# Patient Record
Sex: FEMALE | Race: OTHER | Employment: OTHER | URBAN - METROPOLITAN AREA
[De-identification: names, ages, dates, MRNs, and addresses within clinical notes are randomized per-mention and may not be internally consistent; named-entity substitution may affect disease eponyms.]

---

## 2020-01-20 PROBLEM — Z00.00 MEDICARE ANNUAL WELLNESS VISIT, INITIAL: Status: ACTIVE | Noted: 2020-01-20

## 2020-01-20 PROBLEM — Z12.31 VISIT FOR SCREENING MAMMOGRAM: Status: ACTIVE | Noted: 2020-01-20

## 2020-06-18 PROBLEM — Z12.11 COLON CANCER SCREENING: Status: ACTIVE | Noted: 2020-01-20

## 2021-02-09 PROBLEM — Z00.00 MEDICARE ANNUAL WELLNESS VISIT, SUBSEQUENT: Status: ACTIVE | Noted: 2020-01-20

## 2021-02-09 PROBLEM — R73.01 IMPAIRED FASTING GLUCOSE: Status: ACTIVE | Noted: 2021-02-09

## 2021-08-24 PROBLEM — E55.9 VITAMIN D DEFICIENCY: Status: ACTIVE | Noted: 2021-08-24

## 2021-08-24 PROBLEM — Z12.11 COLON CANCER SCREENING: Status: ACTIVE | Noted: 2021-08-24

## 2022-02-18 ENCOUNTER — LABORATORY ENCOUNTER (OUTPATIENT)
Dept: LAB | Age: 72
End: 2022-02-18
Attending: ORTHOPAEDIC SURGERY
Payer: MEDICARE

## 2022-02-18 ENCOUNTER — LAB ENCOUNTER (OUTPATIENT)
Dept: LAB | Age: 72
End: 2022-02-18
Attending: ORTHOPAEDIC SURGERY
Payer: MEDICARE

## 2022-02-18 DIAGNOSIS — M17.0 PRIMARY OSTEOARTHRITIS OF BOTH KNEES: ICD-10-CM

## 2022-02-18 LAB
ANTIBODY SCREEN: NEGATIVE
RH BLOOD TYPE: POSITIVE

## 2022-02-18 PROCEDURE — 86900 BLOOD TYPING SEROLOGIC ABO: CPT

## 2022-02-18 PROCEDURE — 86850 RBC ANTIBODY SCREEN: CPT

## 2022-02-18 PROCEDURE — 86901 BLOOD TYPING SEROLOGIC RH(D): CPT

## 2022-02-19 LAB — SARS-COV-2 RNA RESP QL NAA+PROBE: NOT DETECTED

## 2022-02-20 ENCOUNTER — ANESTHESIA EVENT (OUTPATIENT)
Dept: SURGERY | Facility: HOSPITAL | Age: 72
End: 2022-02-20
Payer: MEDICARE

## 2022-02-21 ENCOUNTER — APPOINTMENT (OUTPATIENT)
Dept: GENERAL RADIOLOGY | Facility: HOSPITAL | Age: 72
End: 2022-02-21
Attending: ORTHOPAEDIC SURGERY
Payer: MEDICARE

## 2022-02-21 ENCOUNTER — ANESTHESIA (OUTPATIENT)
Dept: SURGERY | Facility: HOSPITAL | Age: 72
End: 2022-02-21
Payer: MEDICARE

## 2022-02-21 ENCOUNTER — HOSPITAL ENCOUNTER (OUTPATIENT)
Facility: HOSPITAL | Age: 72
Discharge: HOME HEALTH | End: 2022-02-22
Attending: ORTHOPAEDIC SURGERY | Admitting: ORTHOPAEDIC SURGERY
Payer: MEDICARE

## 2022-02-21 DIAGNOSIS — M17.0 PRIMARY OSTEOARTHRITIS OF BOTH KNEES: Primary | ICD-10-CM

## 2022-02-21 LAB
APTT PPP: 31.6 SECONDS (ref 23.3–35.6)
INR BLD: 0.95 (ref 0.8–1.2)
PROTHROMBIN TIME: 12.6 SECONDS (ref 11.6–14.8)

## 2022-02-21 PROCEDURE — 88305 TISSUE EXAM BY PATHOLOGIST: CPT | Performed by: ORTHOPAEDIC SURGERY

## 2022-02-21 PROCEDURE — 85610 PROTHROMBIN TIME: CPT | Performed by: ORTHOPAEDIC SURGERY

## 2022-02-21 PROCEDURE — 97530 THERAPEUTIC ACTIVITIES: CPT

## 2022-02-21 PROCEDURE — 85730 THROMBOPLASTIN TIME PARTIAL: CPT | Performed by: ORTHOPAEDIC SURGERY

## 2022-02-21 PROCEDURE — 0SRC0J9 REPLACEMENT OF RIGHT KNEE JOINT WITH SYNTHETIC SUBSTITUTE, CEMENTED, OPEN APPROACH: ICD-10-PCS | Performed by: ORTHOPAEDIC SURGERY

## 2022-02-21 PROCEDURE — 97161 PT EVAL LOW COMPLEX 20 MIN: CPT

## 2022-02-21 PROCEDURE — 73560 X-RAY EXAM OF KNEE 1 OR 2: CPT | Performed by: ORTHOPAEDIC SURGERY

## 2022-02-21 PROCEDURE — 88311 DECALCIFY TISSUE: CPT | Performed by: ORTHOPAEDIC SURGERY

## 2022-02-21 PROCEDURE — 76942 ECHO GUIDE FOR BIOPSY: CPT | Performed by: ANESTHESIOLOGY

## 2022-02-21 DEVICE — IMPLANTABLE DEVICE
Type: IMPLANTABLE DEVICE | Site: KNEE | Status: FUNCTIONAL
Brand: PERSONA® VIVACIT-E®

## 2022-02-21 DEVICE — IMPLANTABLE DEVICE
Type: IMPLANTABLE DEVICE | Site: KNEE | Status: FUNCTIONAL
Brand: PERSONA®

## 2022-02-21 DEVICE — IMPLANTABLE DEVICE
Type: IMPLANTABLE DEVICE | Site: KNEE | Status: FUNCTIONAL
Brand: PERSONA™

## 2022-02-21 DEVICE — IMPLANTABLE DEVICE
Type: IMPLANTABLE DEVICE | Site: KNEE | Status: FUNCTIONAL
Brand: BIOMET® BONE CEMENT R

## 2022-02-21 DEVICE — IMPLANTABLE DEVICE
Type: IMPLANTABLE DEVICE | Site: KNEE | Status: FUNCTIONAL
Brand: PERSONA® NATURAL TIBIA®

## 2022-02-21 RX ORDER — EPHEDRINE SULFATE 50 MG/ML
INJECTION INTRAVENOUS AS NEEDED
Status: DISCONTINUED | OUTPATIENT
Start: 2022-02-21 | End: 2022-02-21 | Stop reason: SURG

## 2022-02-21 RX ORDER — BUPIVACAINE HYDROCHLORIDE 7.5 MG/ML
INJECTION, SOLUTION INTRASPINAL AS NEEDED
Status: DISCONTINUED | OUTPATIENT
Start: 2022-02-21 | End: 2022-02-21 | Stop reason: SURG

## 2022-02-21 RX ORDER — CEFAZOLIN SODIUM/WATER 2 G/20 ML
2 SYRINGE (ML) INTRAVENOUS EVERY 8 HOURS
Status: COMPLETED | OUTPATIENT
Start: 2022-02-21 | End: 2022-02-22

## 2022-02-21 RX ORDER — ACETAMINOPHEN 325 MG/1
TABLET ORAL
Status: COMPLETED
Start: 2022-02-21 | End: 2022-02-21

## 2022-02-21 RX ORDER — METOCLOPRAMIDE HYDROCHLORIDE 5 MG/ML
10 INJECTION INTRAMUSCULAR; INTRAVENOUS AS NEEDED
Status: DISCONTINUED | OUTPATIENT
Start: 2022-02-21 | End: 2022-02-21 | Stop reason: HOSPADM

## 2022-02-21 RX ORDER — BISACODYL 10 MG
10 SUPPOSITORY, RECTAL RECTAL
Status: DISCONTINUED | OUTPATIENT
Start: 2022-02-21 | End: 2022-02-22

## 2022-02-21 RX ORDER — ACETAMINOPHEN 500 MG
1000 TABLET ORAL ONCE
Status: DISCONTINUED | OUTPATIENT
Start: 2022-02-21 | End: 2022-02-21 | Stop reason: HOSPADM

## 2022-02-21 RX ORDER — NALOXONE HYDROCHLORIDE 0.4 MG/ML
80 INJECTION, SOLUTION INTRAMUSCULAR; INTRAVENOUS; SUBCUTANEOUS AS NEEDED
Status: DISCONTINUED | OUTPATIENT
Start: 2022-02-21 | End: 2022-02-21 | Stop reason: HOSPADM

## 2022-02-21 RX ORDER — SODIUM CHLORIDE, SODIUM LACTATE, POTASSIUM CHLORIDE, CALCIUM CHLORIDE 600; 310; 30; 20 MG/100ML; MG/100ML; MG/100ML; MG/100ML
INJECTION, SOLUTION INTRAVENOUS CONTINUOUS
Status: DISCONTINUED | OUTPATIENT
Start: 2022-02-21 | End: 2022-02-21 | Stop reason: HOSPADM

## 2022-02-21 RX ORDER — DEXAMETHASONE SODIUM PHOSPHATE 10 MG/ML
8 INJECTION, SOLUTION INTRAMUSCULAR; INTRAVENOUS ONCE
Status: COMPLETED | OUTPATIENT
Start: 2022-02-22 | End: 2022-02-22

## 2022-02-21 RX ORDER — ONDANSETRON 2 MG/ML
INJECTION INTRAMUSCULAR; INTRAVENOUS AS NEEDED
Status: DISCONTINUED | OUTPATIENT
Start: 2022-02-21 | End: 2022-02-21 | Stop reason: SURG

## 2022-02-21 RX ORDER — OXYCODONE HYDROCHLORIDE 5 MG/1
2.5 TABLET ORAL EVERY 4 HOURS PRN
Status: DISCONTINUED | OUTPATIENT
Start: 2022-02-21 | End: 2022-02-22

## 2022-02-21 RX ORDER — DOCUSATE SODIUM 100 MG/1
100 CAPSULE, LIQUID FILLED ORAL 2 TIMES DAILY
Status: DISCONTINUED | OUTPATIENT
Start: 2022-02-21 | End: 2022-02-22

## 2022-02-21 RX ORDER — PHENYLEPHRINE HCL 10 MG/ML
VIAL (ML) INJECTION AS NEEDED
Status: DISCONTINUED | OUTPATIENT
Start: 2022-02-21 | End: 2022-02-21 | Stop reason: SURG

## 2022-02-21 RX ORDER — OXYCODONE HYDROCHLORIDE 5 MG/1
5 TABLET ORAL EVERY 4 HOURS PRN
Status: DISCONTINUED | OUTPATIENT
Start: 2022-02-21 | End: 2022-02-22

## 2022-02-21 RX ORDER — ACETAMINOPHEN 325 MG/1
650 TABLET ORAL ONCE
Status: COMPLETED | OUTPATIENT
Start: 2022-02-21 | End: 2022-02-21

## 2022-02-21 RX ORDER — SODIUM CHLORIDE 9 MG/ML
INJECTION, SOLUTION INTRAVENOUS CONTINUOUS
Status: DISCONTINUED | OUTPATIENT
Start: 2022-02-21 | End: 2022-02-22

## 2022-02-21 RX ORDER — HYDROCODONE BITARTRATE AND ACETAMINOPHEN 5; 325 MG/1; MG/1
2 TABLET ORAL AS NEEDED
Status: DISCONTINUED | OUTPATIENT
Start: 2022-02-21 | End: 2022-02-21 | Stop reason: HOSPADM

## 2022-02-21 RX ORDER — TRAMADOL HYDROCHLORIDE 50 MG/1
50 TABLET ORAL EVERY 6 HOURS
Status: DISCONTINUED | OUTPATIENT
Start: 2022-02-21 | End: 2022-02-22

## 2022-02-21 RX ORDER — ONDANSETRON 2 MG/ML
4 INJECTION INTRAMUSCULAR; INTRAVENOUS EVERY 4 HOURS PRN
Status: DISCONTINUED | OUTPATIENT
Start: 2022-02-21 | End: 2022-02-22

## 2022-02-21 RX ORDER — BUPRENORPHINE HYDROCHLORIDE 0.32 MG/ML
INJECTION INTRAMUSCULAR; INTRAVENOUS AS NEEDED
Status: DISCONTINUED | OUTPATIENT
Start: 2022-02-21 | End: 2022-02-21 | Stop reason: SURG

## 2022-02-21 RX ORDER — DIPHENHYDRAMINE HCL 25 MG
25 CAPSULE ORAL EVERY 4 HOURS PRN
Status: DISCONTINUED | OUTPATIENT
Start: 2022-02-21 | End: 2022-02-22

## 2022-02-21 RX ORDER — HYDROMORPHONE HYDROCHLORIDE 1 MG/ML
0.4 INJECTION, SOLUTION INTRAMUSCULAR; INTRAVENOUS; SUBCUTANEOUS EVERY 2 HOUR PRN
Status: DISCONTINUED | OUTPATIENT
Start: 2022-02-21 | End: 2022-02-22

## 2022-02-21 RX ORDER — ACETAMINOPHEN 325 MG/1
650 TABLET ORAL 4 TIMES DAILY
Status: DISCONTINUED | OUTPATIENT
Start: 2022-02-21 | End: 2022-02-22

## 2022-02-21 RX ORDER — ONDANSETRON 2 MG/ML
4 INJECTION INTRAMUSCULAR; INTRAVENOUS AS NEEDED
Status: DISCONTINUED | OUTPATIENT
Start: 2022-02-21 | End: 2022-02-21 | Stop reason: HOSPADM

## 2022-02-21 RX ORDER — ASPIRIN 81 MG/1
81 TABLET ORAL 2 TIMES DAILY
Status: DISCONTINUED | OUTPATIENT
Start: 2022-02-21 | End: 2022-02-22

## 2022-02-21 RX ORDER — SODIUM PHOSPHATE, DIBASIC AND SODIUM PHOSPHATE, MONOBASIC 7; 19 G/133ML; G/133ML
1 ENEMA RECTAL ONCE AS NEEDED
Status: DISCONTINUED | OUTPATIENT
Start: 2022-02-21 | End: 2022-02-22

## 2022-02-21 RX ORDER — HYDROMORPHONE HYDROCHLORIDE 1 MG/ML
0.2 INJECTION, SOLUTION INTRAMUSCULAR; INTRAVENOUS; SUBCUTANEOUS EVERY 2 HOUR PRN
Status: DISCONTINUED | OUTPATIENT
Start: 2022-02-21 | End: 2022-02-22

## 2022-02-21 RX ORDER — TRANEXAMIC ACID 10 MG/ML
1000 INJECTION, SOLUTION INTRAVENOUS ONCE
Status: COMPLETED | OUTPATIENT
Start: 2022-02-21 | End: 2022-02-21

## 2022-02-21 RX ORDER — MIDAZOLAM HYDROCHLORIDE 1 MG/ML
1 INJECTION INTRAMUSCULAR; INTRAVENOUS EVERY 5 MIN PRN
Status: DISCONTINUED | OUTPATIENT
Start: 2022-02-21 | End: 2022-02-21 | Stop reason: HOSPADM

## 2022-02-21 RX ORDER — SODIUM CHLORIDE, SODIUM LACTATE, POTASSIUM CHLORIDE, CALCIUM CHLORIDE 600; 310; 30; 20 MG/100ML; MG/100ML; MG/100ML; MG/100ML
INJECTION, SOLUTION INTRAVENOUS CONTINUOUS
Status: DISCONTINUED | OUTPATIENT
Start: 2022-02-21 | End: 2022-02-21

## 2022-02-21 RX ORDER — MELATONIN
325
Status: DISCONTINUED | OUTPATIENT
Start: 2022-02-22 | End: 2022-02-22

## 2022-02-21 RX ORDER — DIPHENHYDRAMINE HYDROCHLORIDE 50 MG/ML
12.5 INJECTION INTRAMUSCULAR; INTRAVENOUS EVERY 4 HOURS PRN
Status: DISCONTINUED | OUTPATIENT
Start: 2022-02-21 | End: 2022-02-22

## 2022-02-21 RX ORDER — DEXAMETHASONE SODIUM PHOSPHATE 4 MG/ML
4 VIAL (ML) INJECTION AS NEEDED
Status: DISCONTINUED | OUTPATIENT
Start: 2022-02-21 | End: 2022-02-21 | Stop reason: HOSPADM

## 2022-02-21 RX ORDER — SENNOSIDES 8.6 MG
17.2 TABLET ORAL NIGHTLY
Status: DISCONTINUED | OUTPATIENT
Start: 2022-02-21 | End: 2022-02-22

## 2022-02-21 RX ORDER — PRAVASTATIN SODIUM 10 MG
10 TABLET ORAL NIGHTLY
Refills: 0 | Status: DISCONTINUED | OUTPATIENT
Start: 2022-02-21 | End: 2022-02-22

## 2022-02-21 RX ORDER — DIPHENHYDRAMINE HYDROCHLORIDE 50 MG/ML
25 INJECTION INTRAMUSCULAR; INTRAVENOUS ONCE AS NEEDED
Status: ACTIVE | OUTPATIENT
Start: 2022-02-21 | End: 2022-02-21

## 2022-02-21 RX ORDER — DEXAMETHASONE SODIUM PHOSPHATE 4 MG/ML
VIAL (ML) INJECTION AS NEEDED
Status: DISCONTINUED | OUTPATIENT
Start: 2022-02-21 | End: 2022-02-21 | Stop reason: SURG

## 2022-02-21 RX ORDER — HYDROMORPHONE HYDROCHLORIDE 1 MG/ML
0.4 INJECTION, SOLUTION INTRAMUSCULAR; INTRAVENOUS; SUBCUTANEOUS EVERY 5 MIN PRN
Status: DISCONTINUED | OUTPATIENT
Start: 2022-02-21 | End: 2022-02-21 | Stop reason: HOSPADM

## 2022-02-21 RX ORDER — CEFAZOLIN SODIUM/WATER 2 G/20 ML
2 SYRINGE (ML) INTRAVENOUS ONCE
Status: COMPLETED | OUTPATIENT
Start: 2022-02-21 | End: 2022-02-21

## 2022-02-21 RX ORDER — LISINOPRIL AND HYDROCHLOROTHIAZIDE 20; 12.5 MG/1; MG/1
1 TABLET ORAL
Status: DISCONTINUED | OUTPATIENT
Start: 2022-02-22 | End: 2022-02-21 | Stop reason: SDUPTHER

## 2022-02-21 RX ORDER — PROCHLORPERAZINE EDISYLATE 5 MG/ML
10 INJECTION INTRAMUSCULAR; INTRAVENOUS EVERY 6 HOURS PRN
Status: DISCONTINUED | OUTPATIENT
Start: 2022-02-21 | End: 2022-02-22

## 2022-02-21 RX ORDER — HYDROCODONE BITARTRATE AND ACETAMINOPHEN 5; 325 MG/1; MG/1
1 TABLET ORAL AS NEEDED
Status: DISCONTINUED | OUTPATIENT
Start: 2022-02-21 | End: 2022-02-21 | Stop reason: HOSPADM

## 2022-02-21 RX ORDER — POLYETHYLENE GLYCOL 3350 17 G/17G
17 POWDER, FOR SOLUTION ORAL DAILY PRN
Status: DISCONTINUED | OUTPATIENT
Start: 2022-02-21 | End: 2022-02-22

## 2022-02-21 RX ORDER — KETOROLAC TROMETHAMINE 15 MG/ML
15 INJECTION, SOLUTION INTRAMUSCULAR; INTRAVENOUS EVERY 6 HOURS
Status: COMPLETED | OUTPATIENT
Start: 2022-02-21 | End: 2022-02-22

## 2022-02-21 RX ADMIN — TRANEXAMIC ACID 1000 MG: 10 INJECTION, SOLUTION INTRAVENOUS at 08:37:00

## 2022-02-21 RX ADMIN — PHENYLEPHRINE HCL 200 MCG: 10 MG/ML VIAL (ML) INJECTION at 08:50:00

## 2022-02-21 RX ADMIN — CEFAZOLIN SODIUM/WATER 2 G: 2 G/20 ML SYRINGE (ML) INTRAVENOUS at 08:35:00

## 2022-02-21 RX ADMIN — PHENYLEPHRINE HCL 200 MCG: 10 MG/ML VIAL (ML) INJECTION at 09:04:00

## 2022-02-21 RX ADMIN — BUPIVACAINE HYDROCHLORIDE 1.6 ML: 7.5 INJECTION, SOLUTION INTRASPINAL at 08:29:00

## 2022-02-21 RX ADMIN — PHENYLEPHRINE HCL 200 MCG: 10 MG/ML VIAL (ML) INJECTION at 08:35:00

## 2022-02-21 RX ADMIN — DEXAMETHASONE SODIUM PHOSPHATE 4 MG: 4 MG/ML VIAL (ML) INJECTION at 08:43:00

## 2022-02-21 RX ADMIN — BUPRENORPHINE HYDROCHLORIDE 150 MCG: 0.32 INJECTION INTRAMUSCULAR; INTRAVENOUS at 08:33:00

## 2022-02-21 RX ADMIN — EPHEDRINE SULFATE 5 MG: 50 INJECTION INTRAVENOUS at 09:06:00

## 2022-02-21 RX ADMIN — EPHEDRINE SULFATE 10 MG: 50 INJECTION INTRAVENOUS at 09:20:00

## 2022-02-21 RX ADMIN — DEXAMETHASONE SODIUM PHOSPHATE 2 MG: 4 MG/ML VIAL (ML) INJECTION at 08:33:00

## 2022-02-21 RX ADMIN — ONDANSETRON 4 MG: 2 INJECTION INTRAMUSCULAR; INTRAVENOUS at 08:43:00

## 2022-02-21 RX ADMIN — SODIUM CHLORIDE, SODIUM LACTATE, POTASSIUM CHLORIDE, CALCIUM CHLORIDE: 600; 310; 30; 20 INJECTION, SOLUTION INTRAVENOUS at 09:21:00

## 2022-02-21 NOTE — PLAN OF CARE
Problem: PAIN - ADULT  Goal: Verbalizes/displays adequate comfort level or patient's stated pain goal  Description: INTERVENTIONS:  - Encourage pt to monitor pain and request assistance  - Assess pain using appropriate pain scale  - Administer analgesics based on type and severity of pain and evaluate response  - Implement non-pharmacological measures as appropriate and evaluate response  - Consider cultural and social influences on pain and pain management  - Manage/alleviate anxiety  - Utilize distraction and/or relaxation techniques  - Monitor for opioid side effects  - Notify MD/LIP if interventions unsuccessful or patient reports new pain  - Anticipate increased pain with activity and pre-medicate as appropriate  Outcome: Progressing     Problem: SAFETY ADULT - FALL  Goal: Free from fall injury  Description: INTERVENTIONS:  - Assess pt frequently for physical needs  - Identify cognitive and physical deficits and behaviors that affect risk of falls.   - [place] fall precautions as indicated by assessment.  - Educate pt/family on patient safety including physical limitations  - Instruct pt to call for assistance with activity based on assessment  - Modify environment to reduce risk of injury  - Provide assistive devices as appropriate  - Consider OT/PT consult to assist with strengthening/mobility  - Encourage toileting schedule  Outcome: Progressing     Anticipate PT to evaluate patient later today

## 2022-02-21 NOTE — PROGRESS NOTES
NURSING ADMISSION NOTE      Patient admitted via Cart/ bed from PACU post, right total knee replacement. Oriented to room. Received awake, alert and oriented x 4, primarily [place] speaking, daughter at the bedside to interpret. Patient verbalized feeling sleepy. Safety precautions initiated. Post surgical orders discussed with patient and daughter. Bed in low position. Vital signs taken and body system assessment done. Call light in reach. Reinforced instructions regarding \"call and don't fall\" safety protocol. [facility] Dr. Chelsea herring, waiting for call back.

## 2022-02-21 NOTE — ANESTHESIA PROCEDURE NOTES
Spinal Block  Performed by: Hannah James MD  Authorized by: Hannah James MD       General Information and Staff    Start Time:  2/21/2022 8:25 AM  End Time:  2/21/2022 8:29 AM  Anesthesiologist:  Hannah James MD  Performed by:   Anesthesiologist  Site identification: surface landmarks    Preanesthetic Checklist: patient identified, IV checked, risks and benefits discussed, monitors and equipment checked, pre-op evaluation, timeout performed, anesthesia consent and sterile technique used      Procedure Details    Patient Position:  Sitting  Prep: ChloraPrep    Monitoring:  Cardiac monitor, heart rate and continuous pulse ox  Approach:  Midline  Location:  L3-4  Injection Technique:  Single-shot    Needle    Needle Type:  Sprotte  Needle Gauge:  24 G  Needle Length:  3.5 in    Assessment    Sensory Level:   Events: clear CSF, CSF aspirated, well tolerated and blood negative     Additional Comments

## 2022-02-21 NOTE — OPERATIVE REPORT
OPERATIVE REPORT    Facility: [facility]  Patient name: Ed Mary  : 1950        Medical record: [id number]  Date of procedure: 2022  Pre-operative diagnosis: Left knee end-stage degenerative joint disease  Post-operative diagnosis: Same  Procedure performed: Left total knee arthroplasty  Implants: [employer] TKA   Femur: size 7 Persona narrow  CR   Tibial tray: size D Persona with 30 mm stem given bone quality and BMI to add stability    Patella - 35 mm   Bearing - 10 mm Medial Congruent  Surgeon: Mark Navarro M.D. Assistant(s):  1. BHARATH Noe  2. Amirah Regalado  Anesthesia: Epidural/Adductor Canal Femoral Nerve Block  Estimated blood loss: 150 milliliters   Drains: none  Specimens: None  Complications: None apparent            INDICATIONS:  Ed Mary is a pleasant 66year old year old who presented to my office with a long history of knee pain. The patient failed conservative therapy and we discussed surgical treatment options including total knee arthroplasty. Prior to surgery we discussed the risks, benefits, alternatives to surgery, and surgical convalescence. Surgical consent was obtained both in the office, as well as the day of surgery. Risks of the procedure include, but are not limited to, infection, DVT, PE, damage to nerves or blood vessels at the time of surgery, bleeding and blood loss, stiffness/arthrofibrosis, and risk of loosening or failure of the components requiring revision surgery. The patient expressed understanding and wished to proceed with the surgery. An informed consent form was signed and placed on the chart prior to coming to the Operating Room. PROCEDURE: The patient was brought to the operating room and once obtaining satisfactory anesthesia was placed in the supine position. The knee was prepped and draped in the usual sterile fashion for a total knee replacement.      A tourniquet was in the room but not applied/inflated during the procedure in an effort to diminish quadriceps pain/dysfunction post operatively. A surgical timeout was held verifying the site, procedure, and that pre-operative antibiotics had been given. A longitudinal incision was over the anterior aspect of the knee exposing the extensor mechanism. An arthrotomy was performed and the patella displaced laterally. The gross pathologic findings revealed severe degenerative arthritis. Subperiosteal dissection was continued around the proximal medial tibia. The necessary soft tissue release was performed to correct the fixed angular deformity. Care was taken to protect and preserve the medial collateral ligament. Following this, the drill was used to open the femoral canal. After over drilling the canal, the intramedullary femoral guide was seated and the distal femur was resected at the stated valgus angle. The extramedullary tibial cutting guide was then placed and the proximal tibia was resected at the appropriate level perpendicular to the long axis of the tibia. The epicondylar axis and AP axis were determined. The femoral sizing guide was set at the appropriate degree of external rotation. The femur was then sized and the appropriate component selected. The anterior and posterior condyles were then resected with the appropriately sized guide and oscillating saw. With the knee at 90 degrees of flexion, laminar spacers were placed between the femur and tibia. The anterior cruciate ligament was excised. The posterior cruciate ligament was left intact. A medial and lateral meniscectomy were performed. A mixture of 30cc of 0.5% ropivicaine, 10mg Morphine, 30mg toradol, 0.5mg of epinephrine, clonidine 1mL were injected into the knee joint capsule posteriorly while the lamina spreaders were in position and at the capsulotomy incision sites prior to closure for local anesthesia.      The flexion and extension gaps were checked with the appropriate sized spacer and noted to be well balanced. At this time, the tibial cut was checked with the spacer block and the alignment raymon. The distal end of the femur was sculptured with the notch and chamfer cutting guide using an oscillating saw. The tibial fixation hole was created with the tibial template and broach. Prepped with a short stem as well for added stability given medial bone cyst (bone quality) and BMI for added stability. The synovium was excised from around the patella, after which the patella thickness was measured with calipers. The patella was then prepared with the patellar reaming system. The appropriate sized patellar template was seated and the three fixation holes were created with a drill. A bone plug was then shaped and place in the opening created for the intramedullary femoral guide. The trial components were then placed and the knee was found to have proper alignment and was stable through a full range of flexion and extension. The trial components were removed and the bony surfaces were cleaned with pulsatile lavage and an antibiotic solution. The bone was then dried and the permanent components were cemented in a sequential fashion. The tibial component was cemented first. Set in the proper position and rotation. The tibial component was impacted into place, it was fully seated and excess cemented was removed. The femoral component was then cemented in place followed by the patella. Excess cement was removed. Once the cement was hardened the tibial articular surface was implanted. The knee was then reduced and found to have good flexion, full extension with good stability and proper alignment. The patella tracked central.    A dilute (0.35%) betadine lavage was placed in the arthrotomy site to soak the components for 3 minutes prior to wound closure. The solution was made by adding 17.5 ml of 10% povidone-iodine in 500 cc of normal saline, resulting in a 0.35% dilution.  This solution was then removed from the knee and the knee was copiously irrigated. Hemostasis was obtained with electrocautery. The knee irrigated with pulsatile lavage and antibiotic solution followed by normal saline. The arthrotomy was closed with #1 stratafix barbed suture. The subcutaneous tissue was closed in layers with # 0 and # 2-0 Vicryl interrupted sutures. The skin was closed with 3-0 stratfix barbed sutures and dermabond. A sterile compressive dressing was applied. The patient tolerated the procedure well, without complication, and was transferred to the recovery room in a stable condition. The sponge and instrument count was correct. A surgical first assistant was required and necessary for the completion of this case to aid in manipulation and positioning of the extremity while the surgeon operated. Their assistance was vital to the safety and success of the procedure. Deep venous thrombosis prophylaxis will consist of aspirin twice daily according to CHEST guidelines. ____________________________  Franklin Colbert M.D.

## 2022-02-21 NOTE — ANESTHESIA PROCEDURE NOTES
Regional Block  Performed by: Jaime Chambers MD  Authorized by: Jaime Chambers MD       General Information and Staff    Start Time:  2/21/2022 8:32 AM  End Time:  2/21/2022 8:34 AM  Anesthesiologist:  Jaime Chambers MD  Performed by: Anesthesiologist  Patient Location:  OR      Site Identification: real time ultrasound guided and image stored and retrievable    Block site/laterality marked before start: site marked  Reason for Block: at surgeon's request and post-op pain management    Preanesthetic Checklist: 2 patient identifers, IV checked, risks and benefits discussed, monitors and equipment checked, pre-op evaluation, timeout performed, anesthesia consent, sterile technique used, no prohibitive neurological deficits and no local skin infection at insertion site      Procedure Details    Patient Position:  Supine  Prep: ChloraPrep    Monitoring:  Cardiac monitor, continuous pulse ox and blood pressure cuff  Block Type: Adductor canal  Laterality:  Right  Injection Technique:  Single-shot    Needle    Needle Type:  Short-bevel and echogenic  Needle Gauge:  21 G  Needle Length:  100 mm  Needle Localization:  Ultrasound guidance  Reason for Ultrasound Use: appropriate spread of the medication was noted in real time and no ultrasound evidence of intravascular and/or intraneural injection            Assessment    Injection Assessment:  Good spread noted, negative resistance, negative aspiration for heme, incremental injection and low pressure  Heart Rate Change: No    - Patient tolerated block procedure well without evidence of immediate block related complications.      Medications      Additional Comments    Medication:  Ropivacaine 0.375% 20mL + decadron 2mg + buprenex 150mcg
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
## 2022-02-22 VITALS
SYSTOLIC BLOOD PRESSURE: 132 MMHG | DIASTOLIC BLOOD PRESSURE: 57 MMHG | HEIGHT: 62 IN | RESPIRATION RATE: 16 BRPM | HEART RATE: 81 BPM | WEIGHT: 199.5 LBS | OXYGEN SATURATION: 95 % | BODY MASS INDEX: 36.71 KG/M2 | TEMPERATURE: 98 F

## 2022-02-22 PROCEDURE — 97116 GAIT TRAINING THERAPY: CPT

## 2022-02-22 PROCEDURE — 97530 THERAPEUTIC ACTIVITIES: CPT

## 2022-02-22 PROCEDURE — 97535 SELF CARE MNGMENT TRAINING: CPT

## 2022-02-22 PROCEDURE — 97165 OT EVAL LOW COMPLEX 30 MIN: CPT

## 2022-02-22 PROCEDURE — 97110 THERAPEUTIC EXERCISES: CPT

## 2022-02-22 RX ORDER — CELECOXIB 200 MG/1
200 CAPSULE ORAL DAILY
Qty: 30 CAPSULE | Refills: 0 | Status: SHIPPED | OUTPATIENT
Start: 2022-02-22 | End: 2022-03-24

## 2022-02-22 RX ORDER — ASPIRIN 81 MG/1
81 TABLET ORAL 2 TIMES DAILY
Qty: 84 TABLET | Refills: 0 | Status: SHIPPED | OUTPATIENT
Start: 2022-02-22 | End: 2022-04-05

## 2022-02-22 RX ORDER — PREGABALIN 75 MG/1
75 CAPSULE ORAL DAILY
Qty: 30 CAPSULE | Refills: 0 | Status: SHIPPED | OUTPATIENT
Start: 2022-02-22 | End: 2022-03-24

## 2022-02-22 NOTE — PROGRESS NOTES
and daughter at bedside. Patient requested daughter to interpret for [place]ese. Reviewed indications, side effects of pain medication/narcotics and constipation prevention. Stressed importance of increased fluids/roughage in diet, continued use of stool softeners along with laxatives and suppositories as needed while taking narcotics even when at home. Pt verbalized understanding. Teachback done on ankle pumps and incentive spirometry use 10 times every hour w/a for each. Reviewed spandagrip,dressing changes, showering, elevation and cold therapy. Reviewed discharge plan with patient. Solicited and answered any questions regarding care. Guidebook in [facility] [place] provided. Declined to watch discharge video which is in [place].

## 2022-02-22 NOTE — PLAN OF CARE
Patient A/O x4, VSS on RA, denies pain at this time. Primarily [place] speaking. , SCDs. When returning from bathroom patient c/o dizziness, will use commode until dizziness resolves when ambulating. Voiding freely, last BM 2/19. Patients  at bedside. Plan for home w/HH tomorrow. Safety measures in place.

## 2022-02-22 NOTE — PLAN OF CARE
Pt A&Ox4. VSS on room air. Pt reported feeling dizzy while working with physical therapy this AM. MD aware, orthostatic BP negative. SCDs to BLE. R knee incision with aquacel delon C/D/I. Spandagrip to RLE. Gel ice packs on for pain and swelling. Pt tolerating general diet. Last BM 2/19/22. Miralax given this AM. Voiding without difficulty. Pain controlled with PO medications. Fall precautions in place and pt reminded to \"call; don't fall. \" Plan to discharge home possibly later today with [facility] if cleared. POC discussed with pt. Will continue to monitor. 1400 - Spoke with Dr Rosalia Croft, pt is cleared for discharge today.

## 2022-03-10 PROBLEM — Z96.651 STATUS POST TOTAL RIGHT KNEE REPLACEMENT: Status: ACTIVE | Noted: 2022-03-10

## 2024-05-03 ENCOUNTER — LAB ENCOUNTER (OUTPATIENT)
Dept: LAB | Age: 74
End: 2024-05-03
Attending: NURSE PRACTITIONER
Payer: MEDICARE

## 2024-05-03 DIAGNOSIS — Z01.818 PREOP TESTING: ICD-10-CM

## 2024-05-03 LAB
ANTIBODY SCREEN: NEGATIVE
RH BLOOD TYPE: POSITIVE

## 2024-05-03 PROCEDURE — 86901 BLOOD TYPING SEROLOGIC RH(D): CPT

## 2024-05-03 PROCEDURE — 86900 BLOOD TYPING SEROLOGIC ABO: CPT

## 2024-05-03 PROCEDURE — 86850 RBC ANTIBODY SCREEN: CPT

## 2024-05-03 RX ORDER — CELECOXIB 200 MG/1
200 CAPSULE ORAL DAILY
COMMUNITY
Start: 2024-02-20

## 2024-05-03 NOTE — DISCHARGE INSTRUCTIONS
POST OPERATIVE INFORMATION & INSTRUCTIONS FOLLOWING ROBOTIC OR OPEN ABDOMINAL SURGERY  WHAT TO EXPECT AT HOME:     Recovery from surgery is generally 2-6 weeks, but sometimes longer for more strenuous activity.  It is normal to be very tired during this time.     Recuperation varies with each individual.  Some people recover more quickly than others.  Do not be discouraged if you need a little longer to recover.  It is common to have pain along the incisions, temporary bloating/gas pain, or shoulder pain after robotic surgery. This should improve with time and activity.   It often takes several weeks before bladder function returns to normal. It is common for many patients to experience some mild leakage, need to urinate often and immediately. If these problems are persistent and bothersome, please call your doctor's office or discuss at your post-operative visit  If you also had a vaginal surgery, it is normal to have some drainage/discharge or a small amount of vaginal bleeding after surgery which may last up to 6 weeks. You may also pass small pieces of suture for 4-6 weeks after surgery.  This is normal, and stitches are absorbable.     INCISIONS  Showering and bathing:   It is okay to shower 24 hours after your surgery.   Avoid baths/swimming/hot tubs/pools for 4 weeks or until your incisions completely heal. Keeping incisions underwater can affect the healing process.   Your incisions are closed with absorbable sutures and skin glue or surgical tape on top   You may let soapy water run over the incision. There is no need to scrub the incision. Allow the skin glue or surgical tape to fall off on its own.     ACTIVITY   Lifting: No more than 10 pounds for 6 weeks. For reference, a full gallon of milk is 10 pounds. Thus, no lifting laundry, groceries, children, or pets or pushing heavy vacuums, or grocery carts.  No high impact exercises (aerobic activity, using exercise machines, weight-lifting etc.) for 6  weeks.  We encourage you to start walking regularly starting the day after surgery.   You may climb stairs as tolerated  You may return to work 1-4 weeks after surgery, depending on your job and your surgery.  Ask your doctor about your specific situation. Please contact your doctor's office if you need any [id number] letters or medical leave paperwork completed.   Do not put anything in your vagina for 6 weeks after surgery unless otherwise instructed by your doctor (including tampons, douching, sexual intercourse, etc.).     When you begin to have sexual intercourse again, use water soluble lubricants (e.g., K-Y Jelly) for a short period of time. Most of the discomfort that is experienced early improves with time; however, report any long-term discomfort to your doctor.   Do not drive until you feel that you are ready and can safely slam the brakes if needed. Do not drive while you are taking narcotic pain medication  Avoid sitting or lying in bed for more than 2 hours at a time while you are awake to reduce your risk of blood clots.     PAIN      Vaginal soreness and pelvic discomfort are normal for approximately 6 weeks after surgery.    The first 3 days after surgery we recommend that you rotate between Tylenol and ibuprofen so that you are taking one of these medications every 3 to 4 hours while awake. In this way, you can help prevent pain.   Tylenol* and Ibuprofen** should be the first medications you use for pain. Heat or ice may also help. Please take Ibuprofen with food. Some pain medications can cause constipation (see the section on constipation).   After 3 days, you can take Tylenol and Ibuprofen only as needed.   You may have been prescribed a narcotic~ pain medication (Oxycodone, Percocet, Norco, Tylenol #3, Valium, Tramadol, Hydrocodone).  Use narcotic pain medications for severe pain not improved by the above the first few days after surgery. Please transition to Tylenol or ibuprofen only  within the first 2-3 days after surgery if possible.      Pain management plan example:   Step 1: Over the counter medications  Extra strength Tylenol (500 mg) - take 2 tabs (1000 mg) every 6 hours  Ibuprofen (200 mg) - take 3 tabs (600 mg) every 6 hours    For example:   6 AM - take 1000 mg Tylenol  9AM - take 600 mg Ibuprofen  12 PM - again take 1000 mg Tylenol  3 PM - again take 600 mg Ibuprofen  So on and so on…  Step 2: Prescription pain medication  Oxycodone 5 mg: take 1 tab every 8 hours for additional pain if prescribed    PAIN MEDICATION INFORMATION:    For the first 2 days you should take Toradol and Tylenol alternating every 6 hours scheduled. Do not wait for the pain. For example take Toradol at 3pm, Tylenol at 6pm, Toradol at 9pm and so on. This way you can get ahead of any pain before it starts.    *The maximum amount of Tylenol you can take in a 24-hour period is 4000 mg. Taking over this amount could cause liver damage. Make sure that if you are taking additional narcotic pain medication it does not contain acetaminophen, the active ingredient in Tylenol. lf it does, you must account for in your daily total. For example: Norco or Percocet typically contain 325mg of Tylenol. Wean this medication as tolerated. Tylenol is processed by your liver. Do not take Tylenol if you are have a history of liver failure. Do not mix with alcohol.   **You may take 200-800mg of ibuprofen (Advil) every 8 hours as needed for pain after you are done taking the Toradol. Do not take Toradol and Ibuprofen together as they are the same type of medication. This will help with pain from inflammation (swelling). Ibuprofen and Toradol is processed by your kidneys. If you have any history of kidney disease you should avoid ibuprofen, Toradol and all types of NSAIDS (Aleve, Motrin, Advil). Please take ibuprofen with food. Avoid if you have a history of stomach ulcers. If you are taking the maximum dose of ibuprofen (800mg every 8  hours), reduce this amount to 200-400mg ibuprofen every 8 hours as your pain improves.   ~Remember that narcotics are addictive, sedating, and constipating.  You should be taking a stool softener once or twice a day while on this medication. If you are prescribed narcotic pain medication, please use the medication as instructed. Do not use more than instructed. Do not take this medication with alcohol, sedatives, anti-anxiety medications, or sleep aids.      ~ If prescribed, it is important to keep narcotic pills safely stored, as it is at great risk of being stolen or misused by family, friends, or even strangers. Please be sure to dispose of leftover pain medication after you have recovered. You may dispose of unused narcotic medications in the trash with an unpleasant substance such as coffee grounds or cat litter. You can also check FDA.gov to assess which medications can be safely flushed down the toilet.     CONSTIPATION  Miralax daily as a stool softener until you are off of narcotics and your bowel habits are back to normal.  If you have diarrhea, stop the stool softener.    If you have not had a bowel movement 2 days after surgery, you should take Milk of Magnesia, Dulcolax, Metamucil, magnesium citrate or other over-the-counter (OTC) laxatives for relief. Take laxatives with plenty of water.  Do not take Milk of Magnesia or magnesium citrate if you have chronic kidney disease.  If you had a rectocele repair, rectal pressure (feeling like you need to have a bowel movement) is also very common.  However, you should not strain to have a bowel movement or sit on the toilet for extended periods of time.  The feeling like you need to have a bowel movement may just be the swelling from surgery.   Do not use rectal suppositories if you had a rectocele repair for 4 weeks after surgery     HOME MEDICATIONS:  It is uncommon that you would receive an antibiotic prescription to use at home. You received antibiotics  during surgery while in the hospital.  Please resume all of your home medications that you were on before surgery immediately.  If you are on a blood thinning medication, please resume 1 day after surgery unless otherwise instructed.  If you were prescribed vaginal estrogen, you should resume it 6 weeks after surgery unless otherwise instructed.     URINATION AFTER SURGERY  Immediately after surgery, you may have a catheter in place. If so, you may experience urinary urgency and some bladder pressure/pain.  Although these symptoms are often associated with a urinary tract infection, they can also be caused by bladder spasms.  Call our office if you are having fevers in addition to urinary urgency, burning with urination, and bladder pain. A fever (Temp > 101.5 ? F) is more suggestive of an infection.  Before you leave the hospital, you may be asked to perform a voiding trial.  This tests your ability to urinate and empty your bladder after surgery.  If you are able to urinate, you will be discharged home without a catheter.    Even if you pass the voiding trial, there is a small chance that you can go into urinary retention (have difficulty urinating).  If you do not urinate within 4-6 hours of catheter removal and you feel like your bladder is full but you can't urinate, go to your local urologist, local emergency room, or our emergency room for catheter placement.  If this occurs, please call our office so we can schedule an appointment for another voiding trial after the swelling has had time to subside.  If you do not pass the voiding trial prior to discharge, then the nursing staff will replace a Mora catheter in your bladder until the swelling resolves.  You will need to follow-up in our clinic or your local urologist's office in 3-4 days to repeat the voiding trial. In some cases you may also be taught how to perform self-catheterization. If this is necessary, further instructions will be provided.    DIET  You can resume a regular diet once you are discharged.  We recommend a light diet at first if you have nausea or vomiting from pain medications or anesthesia.  We also recommend a high-fiber diet to help with bowel movements.    FOLLOW UP  Typical follow-up is between 3-4 weeks after surgery with your doctor's assistant or your doctor  Your doctor's office will contact you regarding the timing of your follow up appointment  Call the number below for your doctor during normal business hours for your follow up appointment(s)      WHEN SHOULD I CALL THE DOCTOR?  If you have a sudden onset of severe abdominal pain with nausea/vomiting please contact your doctor's office immediately.    Call your doctor if you experience any of the following symptoms:   Bright red vaginal bleeding that soaks a heavy pad  Temperature greater than 101.5 ?F (38.5?C)   Persistent vomiting   Worsening pain that is not relieved by over the counter and prescription pain medication   Large amounts of vaginal discharge that is foul smelling, yellow or green that does not improve.    If questions or concerns:   Call [phone] to reach your physician's office or send a [employer] message and either myself or one of my staff members will get back to you as soon as possible.     OR: Go to the nearest Emergency Room if you feel any signs of symptoms that warrant physician's immediate attention.     Angela Mann DO, [facility]  [phone]         CIRUGIA AMBULATORIA: INSTRUCCIONES DESPUES DE BRANDAN RECIBIDO ANESTESIA  Debido a la Anestesia y a las medicamentos que se le aplicaron chen la cirugia, joe reflejos y capacidaddiscernimiento pueden verse afectados. Tambien podria tener un poco de mareo.Aparte de siguir las precauciones de sentico comun, le recomendamos lo siguiente:     El paciente debe estar acompañado por alguien hasta la mañana siguiente.     No maneje ningun vehiculo automotor ni monte bicicleta.     No tome ninguna  decision importante cate por ejemplo firmar de documentos importantes.     No opere herramientas electricas ni electrodomesticos, tales cate cuchillos electricos, batidoras electricas o serruchos electricos. No amy el cesped con cortadoras electricas. No practique deportes.     No vicente ejercicio.     Para evitar las nauseas, coma menos de lo normal mas o menos la mitad de lo habitual y/o nae solo liquidos hasta la manana siguiente. Consulte con villareal doctor si esta llevando yojana dieta especial.     No tome bebidas alcoholicas, tranquilizantes, pastilles para dormir etc., y verifique con villareal doctor acerca de cualquier medicamento que clark tomando actualmente.     El efecto de la medicación usada en villareal anestesia habra pasado akira por completo a la medianoche. Por lo tanto, puede reanudar joe habitos cotidianos en la mañana.     Los adultos deben descansar lo enid posible por las siguientes 24 horas. Los niños deben permanecer en cama lo enid posible por las siguientes 24 horas.     Si se presenta cualquier problema, puede llamar a villareal propio doctor personal o aceda al centro de [facility].    Si sigue estas instrucciones, se sentira major y estara mas seguro despues de villareal cirugia ambulatoria. Sitiene cualquier pregunta, llame al hospital y pida que lo comuniquen con la enfermera de cirugia [phone], extension [phone].    Instrucciones para el annabelle: después de villareal cirugía   Acaba de someterse a yojana cirugía. Jany la cirugía, le administraron un tipo de medicamento llamado anestesia para que esté relajado y no sienta dolor. Después de la cirugía, christina vez sienta algo de dolor o náuseas. [place] es común. Estos son algunos consejos para sentirse mejor y recuperarse dillon después de la cirugía.   El regreso a casa  Villareal proveedor de atención médica le enseñará cómo cuidarse cuando regrese a villareal casa. También responderá joe preguntas. Pida a un familiar o amigo adulto que lo conduzca  a villareal casa. Chen las primeras 24 horas después de la cirugía, siga estas recomendaciones:   No conduzca ni use maquinaria pesada.  No tome decisiones importantes ni firme ningún documento legal.  Adminístrese los medicamentos según las indicaciones.  Evite el consumo de alcohol.  Si es necesario, coordine para que alguien se quede con usted. Esta persona puede vigilar cualquier problema que se presente y lo ayudará a permanecer seguro.  Asegúrese de asistir a todas joe visitas de control con villareal proveedor de atención médica. Y descanse después de la cirugía chen el tiempo que le indique villareal proveedor.   Cómo sobrellevar el dolor  Si siente dolor después de la cirugía, los analgésicos lo ayudarán a sentirse mejor. [place] los analgésicos según las indicaciones, antes de que el dolor se intensifique. Además, pregunte a villareal proveedor de atención médica o al farmacéutico acerca de otras formas de controlar el dolor. Estas podrían incluir aplicar calor o hielo, o hacer ejercicios de relajación. Y siga todas las instrucciones que le dé villareal cirujano o enfermero.      Cumpla el cronograma de joe medicamentos.     Consejos para anjel analgésicos  Para aliviar el dolor lo tahir posible, recuerde estos puntos:   Los analgésicos pueden causar malestar estomacal. Tomarlos con un poco de comida puede aliviar nahomy efecto.  La mayoría de los calmantes que se talib por la boca necesitan por lo menos de 20 a 30 minutos para surtir efecto.  No espere hasta que villareal dolor se vuelva intenso para anjel el analgésico que le indicaron. Intente que el momento en que puede anjel villareal medicamento coincida con otra actividad. Nahomy podría ser el momento antes de vestirse, salazar un paseo o sentarse a la najera para cenar.  El estreñimiento es un efecto secundario frecuente de algunos analgésicos. Consulte a villareal proveedor de atención médica antes de usar cualquier medicamento, cate laxantes o ablandadores de heces, para ayudar a aliviar el estreñimiento.  También consulte si es preciso evitar algún tipo de alimento. Kaylee mucha cantidad de líquido y comer alimentos cate frutas y verduras con alto contenido de fibra también puede ser beneficioso. Recuerde que no debe kaylee laxantes a menos que villareal cirujano se los indique.  Mezclar bebidas alcohólicas y analgésicos puede causar mareos y enlentecer villareal respiración. Y hasta puede ser mortal. No nae alcohol mientras esté tomando calmantes.  Los analgésicos pueden hacer que tenga reacciones más lentas. No conduzca ni opere maquinaria mientras esté tomando analgésicos.  Villareal proveedor de atención médica puede indicarle que tome acetaminofén (paracetamol) para ayudar a aliviar el dolor. Pregúntele qué cantidad debe kaylee por día. El acetaminofén y otros analgésicos pueden interactuar con joe medicamentos recetados u otros medicamentos de venta colt (OTC, por joe siglas en inglés). Algunos medicamentos recetados contienen acetaminofén y otros ingredientes. Combinar medicamentos recetados y acetaminofén de venta colt para aliviar el dolor puede provocarle yojana sobredosis accidental. Maribel atentamente la etiqueta del envase de joe medicamentos OTC. [place] lo ayudará a saber con exactitud la lista de ingredientes, la cantidad que debe kaylee y cualquier advertencia. [place] también puede ayudarlo a evitar kaylee demasiado acetaminofén. Si tiene preguntas o no entiende la información, pídale a villareal farmacéutico o proveedor de atención médica que se la explique antes de kaylee el medicamento OTC.   Manejo de las náuseas  Algunas personas pueden sentir malestar estomacal (náuseas) después de la cirugía. [place] suele suceder debido a la anestesia, el dolor, los analgésicos, la disminución del movimiento de la comida en el estómago o el estrés de la cirugía. Estos consejos lo ayudarán a manejar las náuseas y a comer alimentos más saludables mientras se recupera. Si seguía un plan alimentario especial antes de la cirugía, pregúntele a villareal proveedor de  atención médica si debe continuarlo mientras se recupera. Consulte con villareal proveedor cómo debería continuar villareal alimentación. Esta puede variar según el tipo de cirugía a la que se sometió. Los siguientes consejos generales pueden serle útiles:   No se fuerce a comer. [place] por villareal cuerpo para saber cuándo comer y qué cantidad.  Comience con líquidos transparentes y sopa. Estos son más fáciles de digerir.  Tan pronto cate se sienta listo, intente comer alimentos semisólidos. Estos incluyen puré de otoniel, puré de manzana y gelatina.  Lentamente, pase a alimentos sólidos. Al principio no coma alimentos grasosos, pesados ni condimentados.  No se fuerce a hacer ekaterina comidas grandes al día. En cambio, coma cantidades pequeñas, cheyanne con mayor frecuencia.  [place] los analgésicos con yojana pequeña cantidad de alimentos sólidos, cate galletas saladas o yojana tostada. [place] ayuda a prevenir las náuseas.  Cuándo llamar a villareal proveedor de atención médica   Llame de inmediato a villareal proveedor de atención médica si nota alguno de los siguientes síntomas:   Sigue teniendo mucho dolor, o el dolor empeora, después de anjel el medicamento. Puede que el medicamento no sea lo suficientemente john. O dillon, puede laquita complicaciones de la cirugía.  Se siente demasiado somnoliento, mareado o adormecido. Quizás el medicamento sea demasiado john.  Tiene efectos secundarios, cate náuseas o vómitos. Villareal proveedor de atención médica puede recomendarle anjel otros medicamentos.  Tiene cambios en la piel, cate sarpullido, picazón o urticaria. [place] puede significar que tiene yojana reacción alérgica. Villareal proveedor puede recomendarle anjel otros medicamentos.  La incisión tiene un aspecto diferente (por ejemplo, se abre yojana parte).  Tiene sangrado o supuración de líquido de la herida y no le dijeron que eso era esperable.  Fiebre de 100.4 °F (38 °C) o más, o según le indique villareal proveedor.  Cuándo llamar al 911  Llame al  911  de inmediato si tiene:    Dificultad para respirar  Yolanda hinchada    Si tiene apnea del sueño obstructiva   Jany la cirugía, le administraron anestesia para que esté cómodo y no sienta dolor. Después de la cirugía, es probable que tenga más ataques de apnea causados por la anestesia y otros medicamentos que le administraron. Los ataques pueden durar más de lo habitual.    En villareal casa, vicente lo siguiente:  Cuando duerma, siga usando villareal dispositivo de presión positiva continua en las vías respiratorias (CPAP, por joe siglas en inglés). A menos que villareal proveedor de atención médica le indique lo contrario, úselo siempre que duerma, ya sea de día o de noche. El dispositivo de CPAP suele usarse para tratar la apnea obstructiva del sueño.  Consulte a villareal proveedor antes de anjel cualquier analgésico, relajante muscular o sedante. Villareal proveedor le dará información sobre los peligros de anjel estos medicamentos.  Comuníquese con villareal proveedor si tiene el sueño demasiado alterado, incluso cuando esté tomando los medicamentos según las instrucciones.  © 2015-0266 [employer]. Todos los derechos reservados. Esta información no pretende sustituir la atención médica profesional. Sólo villareal médico puede diagnosticar y tratar un problema de marky.

## 2024-05-06 ENCOUNTER — ANESTHESIA EVENT (OUTPATIENT)
Dept: SURGERY | Facility: HOSPITAL | Age: 74
End: 2024-05-06
Payer: MEDICARE

## 2024-05-06 ENCOUNTER — ANESTHESIA (OUTPATIENT)
Dept: SURGERY | Facility: HOSPITAL | Age: 74
End: 2024-05-06
Payer: MEDICARE

## 2024-05-06 ENCOUNTER — HOSPITAL ENCOUNTER (OUTPATIENT)
Facility: HOSPITAL | Age: 74
Setting detail: HOSPITAL OUTPATIENT SURGERY
Discharge: HOME | End: 2024-05-06
Attending: STUDENT IN AN ORGANIZED HEALTH CARE EDUCATION/TRAINING PROGRAM | Admitting: STUDENT IN AN ORGANIZED HEALTH CARE EDUCATION/TRAINING PROGRAM
Payer: MEDICARE

## 2024-05-06 VITALS
HEIGHT: 60 IN | SYSTOLIC BLOOD PRESSURE: 111 MMHG | TEMPERATURE: 99 F | BODY MASS INDEX: 38.28 KG/M2 | DIASTOLIC BLOOD PRESSURE: 52 MMHG | OXYGEN SATURATION: 95 % | HEART RATE: 92 BPM | RESPIRATION RATE: 17 BRPM | WEIGHT: 195 LBS

## 2024-05-06 DIAGNOSIS — Z01.818 PREOP TESTING: Primary | ICD-10-CM

## 2024-05-06 PROCEDURE — 88307 TISSUE EXAM BY PATHOLOGIST: CPT | Performed by: STUDENT IN AN ORGANIZED HEALTH CARE EDUCATION/TRAINING PROGRAM

## 2024-05-06 PROCEDURE — 0USG4ZZ REPOSITION VAGINA, PERCUTANEOUS ENDOSCOPIC APPROACH: ICD-10-PCS | Performed by: STUDENT IN AN ORGANIZED HEALTH CARE EDUCATION/TRAINING PROGRAM

## 2024-05-06 PROCEDURE — 0UT94ZL RESECTION OF UTERUS, SUPRACERVICAL, PERCUTANEOUS ENDOSCOPIC APPROACH: ICD-10-PCS | Performed by: STUDENT IN AN ORGANIZED HEALTH CARE EDUCATION/TRAINING PROGRAM

## 2024-05-06 PROCEDURE — 0UT74ZZ RESECTION OF BILATERAL FALLOPIAN TUBES, PERCUTANEOUS ENDOSCOPIC APPROACH: ICD-10-PCS | Performed by: STUDENT IN AN ORGANIZED HEALTH CARE EDUCATION/TRAINING PROGRAM

## 2024-05-06 PROCEDURE — 8E0W4CZ ROBOTIC ASSISTED PROCEDURE OF TRUNK REGION, PERCUTANEOUS ENDOSCOPIC APPROACH: ICD-10-PCS | Performed by: STUDENT IN AN ORGANIZED HEALTH CARE EDUCATION/TRAINING PROGRAM

## 2024-05-06 DEVICE — TRADITIONAL Y MESH
Type: IMPLANTABLE DEVICE | Site: BLADDER | Status: FUNCTIONAL
Brand: UPSYLON™

## 2024-05-06 RX ORDER — CEFAZOLIN SODIUM/WATER 2 G/20 ML
2 SYRINGE (ML) INTRAVENOUS ONCE
Qty: 20 ML | Refills: 0 | Status: COMPLETED | OUTPATIENT
Start: 2024-05-06 | End: 2024-05-06

## 2024-05-06 RX ORDER — HYDROMORPHONE HYDROCHLORIDE 1 MG/ML
0.2 INJECTION, SOLUTION INTRAMUSCULAR; INTRAVENOUS; SUBCUTANEOUS EVERY 5 MIN PRN
Status: DISCONTINUED | OUTPATIENT
Start: 2024-05-06 | End: 2024-05-06

## 2024-05-06 RX ORDER — HYDROMORPHONE HYDROCHLORIDE 1 MG/ML
0.4 INJECTION, SOLUTION INTRAMUSCULAR; INTRAVENOUS; SUBCUTANEOUS EVERY 5 MIN PRN
Status: DISCONTINUED | OUTPATIENT
Start: 2024-05-06 | End: 2024-05-06

## 2024-05-06 RX ORDER — MORPHINE SULFATE 10 MG/ML
6 INJECTION, SOLUTION INTRAMUSCULAR; INTRAVENOUS EVERY 10 MIN PRN
Status: DISCONTINUED | OUTPATIENT
Start: 2024-05-06 | End: 2024-05-06

## 2024-05-06 RX ORDER — SODIUM CHLORIDE, SODIUM LACTATE, POTASSIUM CHLORIDE, CALCIUM CHLORIDE 600; 310; 30; 20 MG/100ML; MG/100ML; MG/100ML; MG/100ML
INJECTION, SOLUTION INTRAVENOUS CONTINUOUS
Status: DISCONTINUED | OUTPATIENT
Start: 2024-05-06 | End: 2024-05-06

## 2024-05-06 RX ORDER — ROCURONIUM BROMIDE 10 MG/ML
INJECTION, SOLUTION INTRAVENOUS AS NEEDED
Status: DISCONTINUED | OUTPATIENT
Start: 2024-05-06 | End: 2024-05-06 | Stop reason: SURG

## 2024-05-06 RX ORDER — METOCLOPRAMIDE HYDROCHLORIDE 5 MG/ML
10 INJECTION INTRAMUSCULAR; INTRAVENOUS ONCE
Status: COMPLETED | OUTPATIENT
Start: 2024-05-06 | End: 2024-05-06

## 2024-05-06 RX ORDER — ONDANSETRON 2 MG/ML
4 INJECTION INTRAMUSCULAR; INTRAVENOUS EVERY 6 HOURS PRN
Status: DISCONTINUED | OUTPATIENT
Start: 2024-05-06 | End: 2024-05-06

## 2024-05-06 RX ORDER — SODIUM CHLORIDE 9 MG/ML
INJECTION, SOLUTION INTRAVENOUS CONTINUOUS PRN
Status: DISCONTINUED | OUTPATIENT
Start: 2024-05-06 | End: 2024-05-06 | Stop reason: SURG

## 2024-05-06 RX ORDER — ONDANSETRON 2 MG/ML
INJECTION INTRAMUSCULAR; INTRAVENOUS AS NEEDED
Status: DISCONTINUED | OUTPATIENT
Start: 2024-05-06 | End: 2024-05-06 | Stop reason: SURG

## 2024-05-06 RX ORDER — MORPHINE SULFATE 4 MG/ML
2 INJECTION, SOLUTION INTRAMUSCULAR; INTRAVENOUS EVERY 10 MIN PRN
Status: DISCONTINUED | OUTPATIENT
Start: 2024-05-06 | End: 2024-05-06

## 2024-05-06 RX ORDER — HYDRALAZINE HYDROCHLORIDE 20 MG/ML
INJECTION INTRAMUSCULAR; INTRAVENOUS AS NEEDED
Status: DISCONTINUED | OUTPATIENT
Start: 2024-05-06 | End: 2024-05-06 | Stop reason: SURG

## 2024-05-06 RX ORDER — ACETAMINOPHEN 500 MG
1000 TABLET ORAL ONCE
Status: COMPLETED | OUTPATIENT
Start: 2024-05-06 | End: 2024-05-06

## 2024-05-06 RX ORDER — LABETALOL HYDROCHLORIDE 5 MG/ML
INJECTION, SOLUTION INTRAVENOUS AS NEEDED
Status: DISCONTINUED | OUTPATIENT
Start: 2024-05-06 | End: 2024-05-06 | Stop reason: SURG

## 2024-05-06 RX ORDER — DEXAMETHASONE SODIUM PHOSPHATE 4 MG/ML
VIAL (ML) INJECTION AS NEEDED
Status: DISCONTINUED | OUTPATIENT
Start: 2024-05-06 | End: 2024-05-06 | Stop reason: SURG

## 2024-05-06 RX ORDER — KETOROLAC TROMETHAMINE 10 MG/1
10 TABLET, FILM COATED ORAL EVERY 6 HOURS PRN
Qty: 20 TABLET | Refills: 0 | Status: SHIPPED | OUTPATIENT
Start: 2024-05-06 | End: 2024-05-13

## 2024-05-06 RX ORDER — LABETALOL HYDROCHLORIDE 5 MG/ML
5 INJECTION, SOLUTION INTRAVENOUS EVERY 5 MIN PRN
Status: DISCONTINUED | OUTPATIENT
Start: 2024-05-06 | End: 2024-05-06

## 2024-05-06 RX ORDER — LIDOCAINE HYDROCHLORIDE 10 MG/ML
INJECTION, SOLUTION EPIDURAL; INFILTRATION; INTRACAUDAL; PERINEURAL AS NEEDED
Status: DISCONTINUED | OUTPATIENT
Start: 2024-05-06 | End: 2024-05-06 | Stop reason: SURG

## 2024-05-06 RX ORDER — FAMOTIDINE 20 MG/1
20 TABLET, FILM COATED ORAL ONCE
Status: COMPLETED | OUTPATIENT
Start: 2024-05-06 | End: 2024-05-06

## 2024-05-06 RX ORDER — GLYCOPYRROLATE 0.2 MG/ML
INJECTION, SOLUTION INTRAMUSCULAR; INTRAVENOUS AS NEEDED
Status: DISCONTINUED | OUTPATIENT
Start: 2024-05-06 | End: 2024-05-06 | Stop reason: SURG

## 2024-05-06 RX ORDER — HYDROMORPHONE HYDROCHLORIDE 1 MG/ML
0.6 INJECTION, SOLUTION INTRAMUSCULAR; INTRAVENOUS; SUBCUTANEOUS EVERY 5 MIN PRN
Status: DISCONTINUED | OUTPATIENT
Start: 2024-05-06 | End: 2024-05-06

## 2024-05-06 RX ORDER — BUPIVACAINE HYDROCHLORIDE 2.5 MG/ML
INJECTION, SOLUTION EPIDURAL; INFILTRATION; INTRACAUDAL AS NEEDED
Status: DISCONTINUED | OUTPATIENT
Start: 2024-05-06 | End: 2024-05-06 | Stop reason: HOSPADM

## 2024-05-06 RX ORDER — FAMOTIDINE 10 MG/ML
20 INJECTION, SOLUTION INTRAVENOUS ONCE
Status: COMPLETED | OUTPATIENT
Start: 2024-05-06 | End: 2024-05-06

## 2024-05-06 RX ORDER — MIDAZOLAM HYDROCHLORIDE 1 MG/ML
INJECTION INTRAMUSCULAR; INTRAVENOUS AS NEEDED
Status: DISCONTINUED | OUTPATIENT
Start: 2024-05-06 | End: 2024-05-06 | Stop reason: SURG

## 2024-05-06 RX ORDER — METOCLOPRAMIDE 10 MG/1
10 TABLET ORAL ONCE
Status: COMPLETED | OUTPATIENT
Start: 2024-05-06 | End: 2024-05-06

## 2024-05-06 RX ORDER — MORPHINE SULFATE 4 MG/ML
4 INJECTION, SOLUTION INTRAMUSCULAR; INTRAVENOUS EVERY 10 MIN PRN
Status: DISCONTINUED | OUTPATIENT
Start: 2024-05-06 | End: 2024-05-06

## 2024-05-06 RX ORDER — KETOROLAC TROMETHAMINE 10 MG/1
10 TABLET, FILM COATED ORAL EVERY 6 HOURS PRN
Qty: 20 TABLET | Refills: 0 | Status: SHIPPED | OUTPATIENT
Start: 2024-05-06 | End: 2024-05-06

## 2024-05-06 RX ORDER — HEPARIN SODIUM 5000 [USP'U]/ML
5000 INJECTION, SOLUTION INTRAVENOUS; SUBCUTANEOUS ONCE
Status: COMPLETED | OUTPATIENT
Start: 2024-05-06 | End: 2024-05-06

## 2024-05-06 RX ORDER — NALOXONE HYDROCHLORIDE 0.4 MG/ML
0.08 INJECTION, SOLUTION INTRAMUSCULAR; INTRAVENOUS; SUBCUTANEOUS AS NEEDED
Status: DISCONTINUED | OUTPATIENT
Start: 2024-05-06 | End: 2024-05-06

## 2024-05-06 RX ORDER — KETOROLAC TROMETHAMINE 30 MG/ML
INJECTION, SOLUTION INTRAMUSCULAR; INTRAVENOUS AS NEEDED
Status: DISCONTINUED | OUTPATIENT
Start: 2024-05-06 | End: 2024-05-06 | Stop reason: SURG

## 2024-05-06 RX ORDER — PHENAZOPYRIDINE HYDROCHLORIDE 200 MG/1
200 TABLET, FILM COATED ORAL ONCE
Status: COMPLETED | OUTPATIENT
Start: 2024-05-06 | End: 2024-05-06

## 2024-05-06 RX ORDER — METOCLOPRAMIDE HYDROCHLORIDE 5 MG/ML
10 INJECTION INTRAMUSCULAR; INTRAVENOUS EVERY 8 HOURS PRN
Status: DISCONTINUED | OUTPATIENT
Start: 2024-05-06 | End: 2024-05-06

## 2024-05-06 RX ORDER — CEFAZOLIN SODIUM/WATER 2 G/20 ML
2 SYRINGE (ML) INTRAVENOUS ONCE
Status: COMPLETED | OUTPATIENT
Start: 2024-05-06 | End: 2024-05-06

## 2024-05-06 RX ADMIN — LABETALOL HYDROCHLORIDE 5 MG: 5 INJECTION, SOLUTION INTRAVENOUS at 08:32:00

## 2024-05-06 RX ADMIN — KETOROLAC TROMETHAMINE 30 MG: 30 INJECTION, SOLUTION INTRAMUSCULAR; INTRAVENOUS at 11:28:00

## 2024-05-06 RX ADMIN — ROCURONIUM BROMIDE 40 MG: 10 INJECTION, SOLUTION INTRAVENOUS at 07:54:00

## 2024-05-06 RX ADMIN — LABETALOL HYDROCHLORIDE 5 MG: 5 INJECTION, SOLUTION INTRAVENOUS at 11:35:00

## 2024-05-06 RX ADMIN — ROCURONIUM BROMIDE 50 MG: 10 INJECTION, SOLUTION INTRAVENOUS at 09:12:00

## 2024-05-06 RX ADMIN — ONDANSETRON 4 MG: 2 INJECTION INTRAMUSCULAR; INTRAVENOUS at 11:16:00

## 2024-05-06 RX ADMIN — LIDOCAINE HYDROCHLORIDE 50 MG: 10 INJECTION, SOLUTION EPIDURAL; INFILTRATION; INTRACAUDAL; PERINEURAL at 07:47:00

## 2024-05-06 RX ADMIN — SODIUM CHLORIDE, SODIUM LACTATE, POTASSIUM CHLORIDE, CALCIUM CHLORIDE: 600; 310; 30; 20 INJECTION, SOLUTION INTRAVENOUS at 07:40:00

## 2024-05-06 RX ADMIN — DEXAMETHASONE SODIUM PHOSPHATE 8 MG: 4 MG/ML VIAL (ML) INJECTION at 08:00:00

## 2024-05-06 RX ADMIN — ROCURONIUM BROMIDE 10 MG: 10 INJECTION, SOLUTION INTRAVENOUS at 07:47:00

## 2024-05-06 RX ADMIN — CEFAZOLIN SODIUM/WATER 2 G: 2 G/20 ML SYRINGE (ML) INTRAVENOUS at 07:55:00

## 2024-05-06 RX ADMIN — MIDAZOLAM HYDROCHLORIDE 2 MG: 1 INJECTION INTRAMUSCULAR; INTRAVENOUS at 07:41:00

## 2024-05-06 RX ADMIN — SODIUM CHLORIDE: 9 INJECTION, SOLUTION INTRAVENOUS at 09:56:00

## 2024-05-06 RX ADMIN — SODIUM CHLORIDE: 9 INJECTION, SOLUTION INTRAVENOUS at 07:52:00

## 2024-05-06 RX ADMIN — GLYCOPYRROLATE 0.2 MG: 0.2 INJECTION, SOLUTION INTRAMUSCULAR; INTRAVENOUS at 08:02:00

## 2024-05-06 RX ADMIN — LABETALOL HYDROCHLORIDE 5 MG: 5 INJECTION, SOLUTION INTRAVENOUS at 08:27:00

## 2024-05-06 RX ADMIN — HYDRALAZINE HYDROCHLORIDE 10 MG: 20 INJECTION INTRAMUSCULAR; INTRAVENOUS at 11:39:00

## 2024-05-06 NOTE — OPERATIVE REPORT
[facility]  part of [facility]    Operative Note         Dolores Finn Location: OR   [facility] [id number] MRN [id number]   Admission Date 5/6/2024 Operation Date 5/6/2024   Attending Physician Angela Mann DO       Patient Name: Dolores Finn     Preoperative Diagnosis: Incomplete uterovaginal prolapse, cystocele, rectocele     Postoperative Diagnosis: Incomplete uterovaginal prolapse, cystocele, rectocele     Procedure(s):  Xi robotic assisted sacrocolpopexy, supracervical hysterectomy, bilateral salpingectomy, cystoscopy, uterus less than 200 grams, perineorrhapy      Primary Surgeon: Angela Mann DO     Surgical Assistant.: Trevor Nelson; Laxmi Atkins CSA     Anesthesia: General     Specimen:   ID Type Source Tests Collected by Time Destination   1 : 1. Uterus and Bilateral Fallopian Tubes Tissue Uterus, fallopian tube(s) SURGICAL PATHOLOGY TISSUE Angela Mann DO 5/6/2024 1103         Estimated Blood Loss: Blood Output: 75 mL (5/6/2024 11:39 AM)   Complications: none     Surgical Findings:   -Excellent suspension of vagina after RASC  -No need for posterior repair   -Cysto with no injury to the bladder and b/l ureteral efflux    Operative Summary:      72yo female with bothersome stage III uterovaginal prolapse. She was counseled on management options for the prolapse including abdominal and vaginal approaches with or without uterine sparing. Given no MARIO on her preop exam, midurethral sling was not recommended. She elected to proceed in fully informed fashion after discussion of procedures, alternatives, benefits, and risks.    An 16-Fr catheter was placed in the bladder. We began gaining access to the abdomen with a Veress needle in the midline. The abdomen was insufflated and an 8mm port was then introduced. The abdominal cavity was inspected with a 30-degree camera. Two additional 8 mm robotic ports were placed on the patient's left side. An additional robotic port was  placed on the patient's right side and an 8 mm assistant airseal port. The skin was infiltrated with Marcaine prior to an incision at each port site. Each port was then visually watched in. Port sites were at least 8-9 cm away from each other in a straight across configuration. Airseal was used for the case. Once all ports were in place the robot was docked from the side.     Once the robotic arms were docked, monopolar afua, bipolar graspers and a prograsp were placed in the arms.      The peritoneum over the sacral promontory was incised. The anterior longitudinal ligament was cleaned off, a trough was created down the peritoneum and opened towards the apex of the vagina. The right ureter was visible throughout dissection and unharmed.     Attention was then turned to the peritoneum at the bladder reflection which was tented up and incised with Monopolar current to create the bladder flap which was advanced to peel the bladder away from the cervix. We proceeded to just above the site of the valenzuela balloon.  Peritoneum was dissected off post vag wall in similar fashion. We then started the supracervical hysterectomy on the right side. Both ovaries were inspected and appeared otherwise normal. The right uteroovarian ligament and mesosalpinx were cauterized to release the right fallopian tube. Bipolar energy was applied along the broad ligament to cauterize and incise down to the junction with the cervix. Care was taken to cauterize all uterine arteries encountered.  This process was repeated on the contralateral side without difficulty, cauterizing the left uteroovarian ligament and mesosalpinx to release the left fallopian tube. Then the broad ligament was cauterized as above. The body of the uterus was then amputated from the cervix with monopolar scissors.  The uterus was placed in the right paracolic gutter.       We then continued developing the anterior and posterior leafs of peritoneum from the vaginal wall.  Once these areas were developed, Y-shaped mesh was delivered into the abdomen. Using 2-0 PDS, the Y mesh was affixed to the anterior vaginal wall and apex. The posterior arm of the mesh was fixated to the posterior vaginal wall and apex. Surgicel powder was used for hemostasis. Excess mesh was cut and removed. Once these were secured we laid the mesh on our previously developed peritoneal trough to the anterior longitudinal ligament. The EEA sizer was placed in the vagina to determine the mesh fixation point and a vaginal exam confirmed adequate prolapse reduction without hypersuspension. Once that was evaluated, we then placed 0-0 Prolene to fix the mesh to the sacral promontory. The mesh was sutured at two places to the sacral promontory. We then retroperitonealized the mesh using an 0-0 V-lock absorbable suture in a running fashion. No bleeding was seen on inspection. Cystoscopy confirmed no sutures in the bladder and bilateral ureteral efflux.     The uterus and fallopian tubes were placed in a specimen bag. The ports were then withdrawn under direct visualization. Pneumoperitoneum was let down and then the robot undocked. The uterus and fallopian tubes were removed from the supraumbilical incision by extending the incision slightly. The supraumbilical fascial incision was then closed with Vicryl 0-0 sutures in a running fashion to adequately close the fascia. All wounds were irrigated copiously, the skin at all ports was closed using 4-0 Vicryl subcuticular fashion. Dermabond was applied over them.       Next, attention was then turned to the perineorrhaphy, no posterior repair was needed. Two Allis clamps were placed along the hymenal ring at the posterior aspect.  Lidocaine with epi was injected below the vaginal epithelium in the area of the widened hiatus. A deo incision was made through the vaginal epithelium with knife. The perineal body was rebuilt also with 0-0 Vicryl. Next, the vaginal and perineal  incision was closed using running locked suture of 2-0 Vicryl.  The vaginal vault was hemostatic at case conclusion. Vaginal sweep negative.     All sponge, instrument, and needle counts were reported as correct. The valenzuela catheter was left in place and will be removed in PACU for a voiding trial.     The patient was cleaned, dried, and repositioned supine. She was extubated and transported to the recovery room for further postoperative monitoring.         Implants:   Implant Name Type Inv. Item Serial No.  Lot No. LRB No. Used Action   MESH COURTNEY VAG  ALLEN Y LTWT LO SURF AREA - SNA  MESH COURTNEY VAG  ALLEN Y LTWT LO SURF AREA NA [employer] WD [id number]  1 Implanted        Drains: 16F valenzuela catheter to PACU     Condition: stable to PACU       Angela Mann DO

## 2024-05-06 NOTE — ANESTHESIA PROCEDURE NOTES
Airway  Date/Time: 5/6/2024 7:49 AM  Urgency: Elective    Airway not difficult    General Information and Staff    Patient location during procedure: OR  Anesthesiologist: Steffi Yi DO  Performed: anesthesiologist   Performed by: Steffi Yi DO  Authorized by: Steffi Yi DO      Indications and Patient Condition  Indications for airway management: anesthesia  Spontaneous Ventilation: absent  Sedation level: deep  Preoxygenated: yes  Patient position: sniffing  Mask difficulty assessment: 1 - vent by mask    Final Airway Details  Final airway type: endotracheal airway      Successful airway: ETT  Cuffed: yes   Successful intubation technique: direct laryngoscopy  Facilitating devices/methods: intubating stylet  Endotracheal tube insertion site: oral  Blade: Hina  Blade size: #4  ETT size (mm): 7.0    Cormack-Lehane Classification: grade I - full view of glottis  Placement verified by: capnometry   Measured from: teeth  ETT to teeth (cm): 20  Number of attempts at approach: 1  Number of other approaches attempted: 0

## 2024-05-06 NOTE — ANESTHESIA PROCEDURE NOTES
Peripheral IV  Date/Time: 5/6/2024 7:52 AM  Inserted by: Steffi Yi DO    Placement  Needle size: 18 G  Laterality: left  Location: hand  Local anesthetic: none  Site prep: alcohol  Technique: anatomical landmarks  Attempts: 1

## 2024-05-06 NOTE — INTERVAL H&P NOTE
Pre-op Diagnosis: Incomplete uterovaginal prolapse, cystocele, rectocele    The above referenced H&P was reviewed by Angela Mann DO on 5/6/2024, the patient was examined and no significant changes have occurred in the patient's condition since the H&P was performed.  I discussed with the patient and/or legal representative the potential benefits, risks and side effects of this procedure; the likelihood of the patient achieving goals; and potential problems that might occur during recuperation.  I discussed reasonable alternatives to the procedure, including risks, benefits and side effects related to the alternatives and risks related to not receiving this procedure.  We will proceed with procedure as planned.

## 2024-05-06 NOTE — ANESTHESIA PREPROCEDURE EVALUATION
Anesthesia PreOp Note    HPI:     Dolores Finn is a 73 year old female who presents for preoperative consultation requested by: Angela Mann DO    Date of Surgery: 5/6/2024    Procedure(s):  Xi robotic assisted sacrocolpopexy, supracervical hysterectomy, bilateral salpingectomy, posterior repair, cystoscopy  XI ROBOT-ASSISTED LAPAROSCOPIC HYSTERECTOMY  CYSTOSCOPY  Indication: Incomplete uterovaginal prolapse, cystocele, rectocele    Relevant Problems   No relevant active problems       NPO:  Last Liquid Consumption Date: 05/05/24  Last Liquid Consumption Time: 1800  Last Solid Consumption Date: 05/05/24  Last Solid Consumption Time: 1800  Last Liquid Consumption Date: 05/05/24          History Review:  Patient Active Problem List    Diagnosis Date Noted     status post total right knee replacement 2/21/2022 GX 5/22/2022 03/10/2022    Vitamin D deficiency 08/24/2021    Colon cancer screening 08/24/2021    Impaired fasting glucose 02/09/2021    Visit for screening mammogram 01/20/2020    Osteoarthritis of left knee, unspecified osteoarthritis type 07/13/2016    Osteopenia 10/29/2013    Essential hypertension 03/23/2011    Mixed hyperlipidemia 09/09/2009       Past Medical History:    Essential hypertension    High blood pressure    High cholesterol    Osteoarthritis       Past Surgical History:   Procedure Laterality Date    Colonoscopy      Other surgical history      Oral tooth extraction surgery        Medications Prior to Admission   Medication Sig Dispense Refill Last Dose    celecoxib 200 MG Oral Cap Take 1 capsule (200 mg total) by mouth daily.   5/2/2024    traMADol 50 MG Oral Tab Take 1 tablet (50 mg total) by mouth every 4 to 6 hours as needed for Pain (Can alternate with Oxycodone). Do not take along with oxycodone or norco (hydrocodone) - separate by 2 hours if needed. 40 tablet 0 5/4/2024    lisinopril-hydroCHLOROthiazide 20-12.5 MG Oral Tab Take 1 tablet by mouth once daily. 90 tablet 0 5/5/2024 at  0900    Lovastatin 10 MG Oral Tab TAKE ONE TABLET BY MOUTH NIGHTLY 90 tablet 0 5/5/2024 at 0900    ergocalciferol 1.25 MG (00810 UT) Oral Cap Take 1 capsule (50,000 Units total) by mouth once a week. 6 capsule 0 4/28/2024    acetaminophen 325 MG Oral Tab Take 1 tablet (325 mg total) by mouth every 6 (six) hours as needed for Pain.   5/4/2024     Current Facility-Administered Medications Ordered in Epic   Medication Dose Route Frequency Provider Last Rate Last Admin    lactated ringers infusion   Intravenous Continuous Angela Mann DO 20 mL/hr at 05/06/24 0652 New Bag at 05/06/24 0652    ceFAZolin (Ancef) 2 g in 20mL IV syringe premix  2 g Intravenous Once Angela Mann DO         Current Outpatient Medications Ordered in Epic   Medication Sig Dispense Refill    Ketorolac Tromethamine 10 MG Oral Tab Take 1 tablet (10 mg total) by mouth every 6 (six) hours as needed. 20 tablet 0       No Known Allergies    Family History   Problem Relation Age of Onset    Cancer Father         stomach     Cancer Sister         ovarian    Cancer Brother         lung    Cancer Sister         ovarian     Social History     Socioeconomic History    Marital status:    Tobacco Use    Smoking status: Never    Smokeless tobacco: Never   Substance and Sexual Activity    Alcohol use: No     Alcohol/week: 0.0 standard drinks of alcohol    Drug use: No       Available pre-op labs reviewed.             Vital Signs:  Body mass index is 38.08 kg/m².   height is 1.524 m (5') and weight is 88.5 kg (195 lb). Her oral temperature is 98.3 °F (36.8 °C). Her blood pressure is 124/66 and her pulse is 68. Her respiration is 16 and oxygen saturation is 98%.   Vitals:    05/03/24 0843 05/06/24 0643   BP:  124/66   Pulse:  68   Resp:  16   Temp:  98.3 °F (36.8 °C)   TempSrc:  Oral   SpO2:  98%   Weight: 93 kg (205 lb) 88.5 kg (195 lb)   Height: 1.524 m (5')         Anesthesia Evaluation      No history of anesthetic complications   Airway    Mallampati: II  TM distance: >3 FB  Neck ROM: full  Dental    (+) upper dentures    Pulmonary - negative ROS and normal exam   (-) asthma, shortness of breath, recent URI  Cardiovascular   Exercise tolerance: good  (+) hypertension  (-) past MI, dysrhythmias, angina, TORO    ECG reviewed  Rhythm: regular  Rate: normal  ROS comment:   EKG   normal sinus rhythm at a rate of 63 normal axis no acute ST-T wave changes      Neuro/Psych - negative ROS   (-) seizures, neuromuscular disease, CVA    GI/Hepatic/Renal - negative ROS   (-) hepatitis, liver disease, renal disease    Endo/Other    (+) arthritis  (-) diabetes mellitus, blood dyscrasia  Abdominal   (+) obese                 Anesthesia Plan:   ASA:  3  Plan:   General  Monitors and Lines:   Additonal IV  Airway:  ETT  Post-op Pain Management: IV analgesics  Informed Consent Plan and Risks Discussed With:  Patient and child/children  Blood Product Use Consented    Discussed plan with:  Surgeon      I have informed ElvieGifty Finn and/or legal guardian or family member of the nature of the anesthetic plan, benefits, risks including possible dental damage if relevant, major complications, and any alternative forms of anesthetic management.   All of the patient's questions were answered to the best of my ability. The patient desires the anesthetic management as planned.  Steffi Yi DO  5/6/2024 7:33 AM  Present on Admission:  **None**

## 2024-05-06 NOTE — H&P
72yo F with prolapse reffered per Dr. Davis. +MARIO maybe but this is coming from her daughter who states her mom won't talk about that stuff. No UTIs. Still has uterus no PMB or abnormal.     Abd surgeries- none           History/Other:          Current Outpatient Medications   Medication Sig Dispense Refill    ergocalciferol 1.25 MG (06690 UT) Oral Cap Take 1 capsule (50,000 Units total) by mouth once a week. 12 capsule 1    lisinopril-hydroCHLOROthiazide 20-12.5 MG Oral Tab Take 1 tablet by mouth daily. 100 tablet 1    Lovastatin 10 MG Oral Tab Take 1 tablet (10 mg total) by mouth nightly. 90 tablet 1    celecoxib 200 MG Oral Cap Take 1 capsule (200 mg total) by mouth daily. 30 capsule 1           Past Medical History:   Diagnosis Date    Essential hypertension      High blood pressure      High cholesterol      Osteoarthritis                Past Surgical History:   Procedure Laterality Date    COLONOSCOPY        OTHER SURGICAL HISTORY         Oral tooth extraction surgery                 Family History   Problem Relation Age of Onset    Cancer Father           stomach     Cancer Sister           ovarian    Cancer Brother           lung    Cancer Sister           ovarian         REVIEW OF SYSTEMS:     A 10-point comprehensive review of systems was negative with pertinent items noted in HPI.            Objective:   GENERAL: well developed, well nourished, in no apparent distress  HEENT: atraumatic, normocephalic  LUNGS: normal respiratory motion without distress  CARDIO:NA  Abd: Soft, non-tender, non-distended  : Normal external genitalia with no lesions or discharge: yes; Vaginal Atrophy yes  Ba +2 C 0  Bp -1 gh 5 TVL 8 D -1  Pelvic Floor TTP: none  MARIO: none  Masses Appreciated: no  NEURO: Alert   EXTREMITIES: Edema no     Cystometrics: no                 Lab Results   Component Value Date     BILIRUBIN Negative 08/29/2022     SPECGRAVITY      >=1.030 (A) 08/29/2022     BLOODU Negative 08/29/2022     UROBILIN 0.2  08/29/2022     NITRITE Negative 08/29/2022            PVR: 2ml           Assessment & Plan:   74yo F  Diagnoses and all orders for this visit:     Cystocele with rectocele  -     Cancel: XR ABDOMEN (1 VIEW) (CPT=74018); Future     Incomplete uterovaginal prolapse      Plan:  Assessment/Plan:  1. Pelvic organ prolapse  Discussed the natural history of pelvic organ prolapse. Prolapse is unlikely to self-resolve, although may not necessarily worsen with time with care in avoidance of abdominal straining, heavy lifting, and weight gain.  All the treatment options were reviewed including      1) Watchful waiting particularly if symptoms are non-bothersome. Should have a good bowel regimen, avoiding constipation and straining. Avoiding heavy weightlifting if possible. Exhaling with lifting to decrease transmission of force to pelvis.       2) Kegel exercise/strenthing - would not anatomically correct the prolapse but may decrease the sensation of a vaginal bulge.       3) pessary placement- would require removal and cleaning regularly (not necessarily daily) that could be performed by the patient with proper instruction or managed by our physician assistant.       4) Surgical repair - which would be an outpatient procedure and require pelvic rest + lifting restrictions for at least 6 weeks.      -Surgical options including vaginal, robotic and open abdominal operations were discussed at length as well as the risks and benefits incurred by treatment option.     -A concurrent mid-urethral sling procedure was also discussed for the treatment of MARIO/prevention of de gwyn MARIO after prolapse repair. All relevant recent data discussed with patient as well as risks associate with mesh.     Daughter is not sure if patient wants to stay sexually active-3 surgeries discussed at length today with both daughters and patient as well as multiple pictures drawn.     She is between robotic assisted sacrocolopopexy, supracervical  hysterectomy, bilateral salpingectomy, posterior repair, and cystoscopy vs SS hysteropexy, A/P repair with or without LVM     Sling was also discussed today and and daughter will talk to the patient more to see if she really is having stress incontinence.  There is no incontinence today on exam.  Multiple questions asked and answered regarding all 3 surgeries.  Patient has to decide if she would like to stay sexually active or not.  If she wants to stay sexually active and wants to go with the vaginal approach she does understand the higher risk of recurrence     Her daughter will MyChart message me with her decision     Ongoing care of patient with a complex and chronic problem    Addendum:  Patient has decided on robotic assisted sacrocolopopexy, supracervical hysterectomy, bilateral salpingectomy, posterior repair, and cystoscopy    Angela Mann DO

## 2024-05-06 NOTE — ANESTHESIA POSTPROCEDURE EVALUATION
Patient: Dolores Finn    Procedure Summary       Date: 05/06/24 Room / Location: [facility] MAIN OR 07 / [facility] MAIN OR    Anesthesia Start: 0740 Anesthesia Stop: 1151    Procedures:       Xi robotic assisted sacrocolpopexy, supracervical hysterectomy, bilateral salpingectomy, cystoscopy, uterus less than 200 grams, perineorrhapy (Abdomen)      XI ROBOT-ASSISTED LAPAROSCOPIC HYSTERECTOMY (Abdomen)      CYSTOSCOPY (Urethra) Diagnosis: (Incomplete uterovaginal prolapse, cystocele, rectocele)    Surgeons: Angela Mann DO Anesthesiologist: Steffi Yi DO    Anesthesia Type: general ASA Status: 3            Anesthesia Type: general    Vitals Value Taken Time   /65 05/06/24 1147   Temp 97.8 °F (36.6 °C) 05/06/24 1147   Pulse 72 05/06/24 1150   Resp 27 05/06/24 1150   SpO2 99 % 05/06/24 1150   Vitals shown include unfiled device data.    [facility] AN Post Evaluation:   Patient Evaluated in PACU  Patient Participation: complete - patient participated  Level of Consciousness: awake  Pain Score: 0  Pain Management: adequate  Airway Patency:patent  Dental exam unchanged from preop  Yes    Cardiovascular Status: hemodynamically stable  Respiratory Status: spontaneous ventilation, nasal cannula, airway suctioned, unassisted and nonlabored ventilation  Postoperative Hydration stable      Steffi Yi DO  5/6/2024 11:51 AM

## 2024-05-11 ENCOUNTER — APPOINTMENT (OUTPATIENT)
Dept: GENERAL RADIOLOGY | Age: 74
DRG: 300 | End: 2024-05-11
Attending: EMERGENCY MEDICINE

## 2024-05-11 ENCOUNTER — APPOINTMENT (OUTPATIENT)
Dept: CT IMAGING | Age: 74
DRG: 300 | End: 2024-05-11
Attending: EMERGENCY MEDICINE

## 2024-05-11 ENCOUNTER — HOSPITAL ENCOUNTER (OUTPATIENT)
Facility: HOSPITAL | Age: 74
Setting detail: OBSERVATION
Discharge: HOME HEALTH | DRG: 300 | End: 2024-05-13
Attending: EMERGENCY MEDICINE | Admitting: INTERNAL MEDICINE

## 2024-05-11 ENCOUNTER — HOSPITAL ENCOUNTER (INPATIENT)
Facility: HOSPITAL | Age: 74
LOS: 1 days | Discharge: HOME HEALTH | DRG: 300 | End: 2024-05-13
Attending: EMERGENCY MEDICINE | Admitting: INTERNAL MEDICINE

## 2024-05-11 DIAGNOSIS — R06.09 EXERTIONAL DYSPNEA: ICD-10-CM

## 2024-05-11 DIAGNOSIS — I26.99 OTHER ACUTE PULMONARY EMBOLISM WITHOUT ACUTE COR PULMONALE (HCC): Primary | ICD-10-CM

## 2024-05-11 LAB
ALBUMIN SERPL-MCNC: 3.3 G/DL (ref 3.4–5)
ALBUMIN/GLOB SERPL: 0.8 {RATIO} (ref 1–2)
ALP LIVER SERPL-CCNC: 58 U/L
ALT SERPL-CCNC: 20 U/L
ANION GAP SERPL CALC-SCNC: 4 MMOL/L (ref 0–18)
AST SERPL-CCNC: 18 U/L (ref 15–37)
BASOPHILS # BLD AUTO: 0.03 X10(3) UL (ref 0–0.2)
BASOPHILS NFR BLD AUTO: 0.3 %
BILIRUB SERPL-MCNC: 0.5 MG/DL (ref 0.1–2)
BUN BLD-MCNC: 16 MG/DL (ref 9–23)
CALCIUM BLD-MCNC: 9.8 MG/DL (ref 8.5–10.1)
CHLORIDE SERPL-SCNC: 109 MMOL/L (ref 98–112)
CO2 SERPL-SCNC: 24 MMOL/L (ref 21–32)
CREAT BLD-MCNC: 0.61 MG/DL
D DIMER PPP FEU-MCNC: 5.85 UG/ML FEU (ref ?–0.75)
EGFRCR SERPLBLD CKD-EPI 2021: 93 ML/MIN/1.73M2 (ref 60–?)
EOSINOPHIL # BLD AUTO: 0.35 X10(3) UL (ref 0–0.7)
EOSINOPHIL NFR BLD AUTO: 3.5 %
ERYTHROCYTE [DISTWIDTH] IN BLOOD BY AUTOMATED COUNT: 14.3 %
GLOBULIN PLAS-MCNC: 4.2 G/DL (ref 2.8–4.4)
GLUCOSE BLD-MCNC: 104 MG/DL (ref 70–99)
HCT VFR BLD AUTO: 41.7 %
HGB BLD-MCNC: 13.8 G/DL
IMM GRANULOCYTES # BLD AUTO: 0.04 X10(3) UL (ref 0–1)
IMM GRANULOCYTES NFR BLD: 0.4 %
LYMPHOCYTES # BLD AUTO: 1.89 X10(3) UL (ref 1–4)
LYMPHOCYTES NFR BLD AUTO: 18.7 %
MCH RBC QN AUTO: 30.8 PG (ref 26–34)
MCHC RBC AUTO-ENTMCNC: 33.1 G/DL (ref 31–37)
MCV RBC AUTO: 93.1 FL
MONOCYTES # BLD AUTO: 0.91 X10(3) UL (ref 0.1–1)
MONOCYTES NFR BLD AUTO: 9 %
NEUTROPHILS # BLD AUTO: 6.9 X10 (3) UL (ref 1.5–7.7)
NEUTROPHILS # BLD AUTO: 6.9 X10(3) UL (ref 1.5–7.7)
NEUTROPHILS NFR BLD AUTO: 68.1 %
OSMOLALITY SERPL CALC.SUM OF ELEC: 285 MOSM/KG (ref 275–295)
PLATELET # BLD AUTO: 221 10(3)UL (ref 150–450)
POTASSIUM SERPL-SCNC: 4.3 MMOL/L (ref 3.5–5.1)
PROT SERPL-MCNC: 7.5 G/DL (ref 6.4–8.2)
RBC # BLD AUTO: 4.48 X10(6)UL
SODIUM SERPL-SCNC: 137 MMOL/L (ref 136–145)
TROPONIN I SERPL HS-MCNC: 5 NG/L
WBC # BLD AUTO: 10.1 X10(3) UL (ref 4–11)

## 2024-05-11 PROCEDURE — 71260 CT THORAX DX C+: CPT | Performed by: EMERGENCY MEDICINE

## 2024-05-11 PROCEDURE — 71045 X-RAY EXAM CHEST 1 VIEW: CPT | Performed by: EMERGENCY MEDICINE

## 2024-05-11 RX ORDER — KETOROLAC TROMETHAMINE 15 MG/ML
15 INJECTION, SOLUTION INTRAMUSCULAR; INTRAVENOUS ONCE
Status: COMPLETED | OUTPATIENT
Start: 2024-05-11 | End: 2024-05-11

## 2024-05-12 PROBLEM — R73.9 HYPERGLYCEMIA: Status: ACTIVE | Noted: 2024-05-12

## 2024-05-12 PROBLEM — I26.99 OTHER ACUTE PULMONARY EMBOLISM WITHOUT ACUTE COR PULMONALE (HCC): Status: ACTIVE | Noted: 2024-05-12

## 2024-05-12 PROBLEM — R06.09 EXERTIONAL DYSPNEA: Status: ACTIVE | Noted: 2024-05-12

## 2024-05-12 PROBLEM — R79.89 AZOTEMIA: Status: ACTIVE | Noted: 2024-05-12

## 2024-05-12 LAB
APTT PPP: 185.3 SECONDS (ref 23–36)
APTT PPP: 26.2 SECONDS (ref 23–36)
APTT PPP: 97.2 SECONDS (ref 23–36)
ATRIAL RATE: 66 BPM
NT-PROBNP SERPL-MCNC: 183 PG/ML (ref ?–450)
P AXIS: 42 DEGREES
P-R INTERVAL: 140 MS
Q-T INTERVAL: 376 MS
QRS DURATION: 80 MS
QTC CALCULATION (BEZET): 394 MS
R AXIS: 7 DEGREES
T AXIS: 31 DEGREES
VENTRICULAR RATE: 66 BPM

## 2024-05-12 PROCEDURE — 99223 1ST HOSP IP/OBS HIGH 75: CPT | Performed by: INTERNAL MEDICINE

## 2024-05-12 RX ORDER — HEPARIN SODIUM 1000 [USP'U]/ML
80 INJECTION, SOLUTION INTRAVENOUS; SUBCUTANEOUS ONCE
Status: COMPLETED | OUTPATIENT
Start: 2024-05-12 | End: 2024-05-12

## 2024-05-12 RX ORDER — BISACODYL 10 MG
10 SUPPOSITORY, RECTAL RECTAL
Status: DISCONTINUED | OUTPATIENT
Start: 2024-05-12 | End: 2024-05-13

## 2024-05-12 RX ORDER — TRAMADOL HYDROCHLORIDE 50 MG/1
50 TABLET ORAL EVERY 6 HOURS PRN
Status: DISCONTINUED | OUTPATIENT
Start: 2024-05-12 | End: 2024-05-13

## 2024-05-12 RX ORDER — ENEMA 19; 7 G/133ML; G/133ML
1 ENEMA RECTAL ONCE AS NEEDED
Status: DISCONTINUED | OUTPATIENT
Start: 2024-05-12 | End: 2024-05-13

## 2024-05-12 RX ORDER — BENZONATATE 100 MG/1
200 CAPSULE ORAL 3 TIMES DAILY PRN
Status: DISCONTINUED | OUTPATIENT
Start: 2024-05-12 | End: 2024-05-13

## 2024-05-12 RX ORDER — SENNOSIDES 8.6 MG
17.2 TABLET ORAL NIGHTLY PRN
Status: DISCONTINUED | OUTPATIENT
Start: 2024-05-12 | End: 2024-05-13

## 2024-05-12 RX ORDER — PRAVASTATIN SODIUM 10 MG
10 TABLET ORAL NIGHTLY
Status: DISCONTINUED | OUTPATIENT
Start: 2024-05-12 | End: 2024-05-13

## 2024-05-12 RX ORDER — METOCLOPRAMIDE HYDROCHLORIDE 5 MG/ML
10 INJECTION INTRAMUSCULAR; INTRAVENOUS EVERY 8 HOURS PRN
Status: DISCONTINUED | OUTPATIENT
Start: 2024-05-12 | End: 2024-05-13

## 2024-05-12 RX ORDER — HEPARIN SODIUM AND DEXTROSE 10000; 5 [USP'U]/100ML; G/100ML
18 INJECTION INTRAVENOUS ONCE
Status: COMPLETED | OUTPATIENT
Start: 2024-05-12 | End: 2024-05-12

## 2024-05-12 RX ORDER — ACETAMINOPHEN 500 MG
500 TABLET ORAL EVERY 4 HOURS PRN
Status: DISCONTINUED | OUTPATIENT
Start: 2024-05-12 | End: 2024-05-13

## 2024-05-12 RX ORDER — MORPHINE SULFATE 2 MG/ML
1 INJECTION, SOLUTION INTRAMUSCULAR; INTRAVENOUS EVERY 4 HOURS PRN
Status: DISCONTINUED | OUTPATIENT
Start: 2024-05-12 | End: 2024-05-13

## 2024-05-12 RX ORDER — MELATONIN
3 NIGHTLY PRN
Status: DISCONTINUED | OUTPATIENT
Start: 2024-05-12 | End: 2024-05-13

## 2024-05-12 RX ORDER — POLYETHYLENE GLYCOL 3350 17 G/17G
17 POWDER, FOR SOLUTION ORAL DAILY PRN
Status: DISCONTINUED | OUTPATIENT
Start: 2024-05-12 | End: 2024-05-13

## 2024-05-12 RX ORDER — ONDANSETRON 2 MG/ML
4 INJECTION INTRAMUSCULAR; INTRAVENOUS EVERY 6 HOURS PRN
Status: DISCONTINUED | OUTPATIENT
Start: 2024-05-12 | End: 2024-05-13

## 2024-05-12 RX ORDER — ECHINACEA PURPUREA EXTRACT 125 MG
1 TABLET ORAL
Status: DISCONTINUED | OUTPATIENT
Start: 2024-05-12 | End: 2024-05-13

## 2024-05-12 RX ORDER — HEPARIN SODIUM AND DEXTROSE 10000; 5 [USP'U]/100ML; G/100ML
INJECTION INTRAVENOUS CONTINUOUS
Status: DISCONTINUED | OUTPATIENT
Start: 2024-05-12 | End: 2024-05-13

## 2024-05-12 NOTE — CM/SW NOTE
Order acknowledged for price check for prescribed Eliquis. Noted RN Dulce, called pt's pharmacy and confirmed cost to be $47. CM/SW can provide coupons prior to DC.     CM/SW will remain available for DC planning and/or support.     Kae Rowell, MARYLOUN, CMSRN    [id number]

## 2024-05-12 NOTE — PLAN OF CARE
Assumed patient care around 0730 this AM. Patient alert and oriented x4, primarily Mongolian speaking,  utilized. Maintaining Spo2 on RA. NSR on tele. Heparin gtt infusing. Patient continent of bowel and bladder, last BM yesterday 5/11. Denies pain at this time. Patient is up with minimal assist in the room. Updated on POC. Needs met.     POC: BLE ultrasound. Echo. Heparin gtt.         Problem: Patient/Family Goals  Goal: Patient/Family Long Term Goal  Description: Patient's Long Term Goal: Stay out of the hospital     Interventions:  - Attend follow up appointments   - Follow medication regimen   - Advance activity as tolerated  - See additional Care Plan goals for specific interventions  Outcome: Progressing  Goal: Patient/Family Short Term Goal  Description: Patient's Short Term Goal: Go home    Interventions:   - Tele monitoring  - Monitor labs  - BLE ultrasound 5/12  - Echo 5/12  - Heparin gtt  - See additional Care Plan goals for specific interventions  Outcome: Progressing     Problem: RESPIRATORY - ADULT  Goal: Achieves optimal ventilation and oxygenation  Description: INTERVENTIONS:  - Assess for changes in respiratory status  - Assess for changes in mentation and behavior  - Position to facilitate oxygenation and minimize respiratory effort  - Oxygen supplementation based on oxygen saturation or ABGs  - Provide Smoking Cessation handout, if applicable  - Encourage broncho-pulmonary hygiene including cough, deep breathe, Incentive Spirometry  - Assess the need for suctioning and perform as needed  - Assess and instruct to report SOB or any respiratory difficulty  - Respiratory Therapy support as indicated  - Manage/alleviate anxiety  - Monitor for signs/symptoms of CO2 retention  Outcome: Progressing     Problem: CARDIOVASCULAR - ADULT  Goal: Maintains optimal cardiac output and hemodynamic stability  Description: INTERVENTIONS:  - Monitor vital signs, rhythm, and trends  - Monitor for bleeding,  hypotension and signs of decreased cardiac output  - Evaluate effectiveness of vasoactive medications to optimize hemodynamic stability  - Monitor arterial and/or venous puncture sites for bleeding and/or hematoma  - Assess quality of pulses, skin color and temperature  - Assess for signs of decreased coronary artery perfusion - ex. Angina  - Evaluate fluid balance, assess for edema, trend weights  Outcome: Progressing  Goal: Absence of cardiac arrhythmias or at baseline  Description: INTERVENTIONS:  - Continuous cardiac monitoring, monitor vital signs, obtain 12 lead EKG if indicated  - Evaluate effectiveness of antiarrhythmic and heart rate control medications as ordered  - Initiate emergency measures for life threatening arrhythmias  - Monitor electrolytes and administer replacement therapy as ordered  Outcome: Progressing

## 2024-05-12 NOTE — PROGRESS NOTES
Brief progress note:    Pt seen with family at bedside serving as . She feels SOB improving, denies CP at this time. No hx smoking, prior lung disease, environmental exposure. Discussed results of CT imaging .    PE:   Gen: NAD   CV : RRR   Pulm : CTAB   Abd: Soft, NT, ND. Surgical incisions well approximated   Ext: WWP, no edema     A/P:    # PE   - TTE, LE dopplers pending   - Heparin ggt continued, apixiban at dc, sent to pharmacy for pricing, SW consulted   - PCP follow-up for duration of AC, provoked PE    # Ground glass findings on CT    - d/w pt and family, no hx fevers/chills or other infectious symptoms   - No wheezing or crackles on exam   - Pulm f/u as outpt for repeat High res CT    Jose Ball MD

## 2024-05-12 NOTE — PLAN OF CARE
NURSING ADMISSION NOTE      Patient admitted via Ambulance  Oriented to room.  Safety precautions initiated.  Bed in low position.  Call light in reach.    AOx4, RA  NSR on tele  VSS  Denies pain  Regular diet  Heparin infusing per protocol, next PTT @ 0732  Pt. Turkish speaking,  needed  Safety precaution in need, call light within reach    Plan for Echo/US BLE

## 2024-05-12 NOTE — H&P
[facility]     Dolores Finn Patient Status:  Inpatient    1950 MRN [id number]   Location [facility] 7NE-A Attending Alireza Ball,    Hosp Day # 0 PCP No primary care provider on file.     Chief Complaint: Shortness of breath    Subjective:    History of Present Illness:     Dolores Finn is a 73 year old female with PMHx hypertension and hyperlipidemia who presents to the hospital for evaluation of shortness of breath.  She had hysterectomy with bilateral salpingectomy done last week due to incomplete uterovaginal prolapse.  She was doing fine initially after going home but was mainly on the first floor of the house.  Over the past day she has been ambulating more and going up and down the stairs.  She noticed that she was more short of breath with pleuritic chest pain.  She denies any fever, chills, cough, congestion, nausea, vomiting, diarrhea or pain elsewhere.  CTA chest showed acute PE in the right upper lobe with no obvious right heart strain.  Troponin is negative.  She was started on heparin drip and admitted.    History/Other:    Past Medical History:  Past Medical History:    Essential hypertension    High blood pressure    High cholesterol    Osteoarthritis    Other acute pulmonary embolism without acute cor pulmonale (HCC)     Past Surgical History:   Past Surgical History:   Procedure Laterality Date    Colonoscopy      Other surgical history      Oral tooth extraction surgery     Total abdom hysterectomy        Family History:   Family History   Problem Relation Age of Onset    Cancer Father         stomach     Cancer Sister         ovarian    Cancer Brother         lung    Cancer Sister         ovarian     Social History:    reports that she has never smoked. She has never used smokeless tobacco. She reports that she does not drink alcohol and does not use drugs.     Allergies: No Known Allergies    Medications:    Current Facility-Administered  Medications on File Prior to Encounter   Medication Dose Route Frequency Provider Last Rate Last Admin    [COMPLETED] acetaminophen (Tylenol Extra Strength) tab 1,000 mg  1,000 mg Oral Once Angela Mann, DO   1,000 mg at 24 0652    [COMPLETED] famotidine (Pepcid) tab 20 mg  20 mg Oral Once JohnathanAngela, DO   20 mg at 24 0651    Or    [COMPLETED] famotidine (Pepcid) 20 mg/2mL injection 20 mg  20 mg Intravenous Once Angela Mann, DO        [COMPLETED] metoclopramide (Reglan) tab 10 mg  10 mg Oral Once JohnathanAngela, DO   10 mg at 24 0651    Or    [COMPLETED] metoclopramide (Reglan) 5 mg/mL injection 10 mg  10 mg Intravenous Once Angela Mann, DO        [COMPLETED] ceFAZolin (Ancef) 2 g in 20mL IV syringe premix  2 g Intravenous Once JohnathanAngela, DO   2 g at 24 0755    [COMPLETED] heparin (Porcine) 5000 UNIT/ML injection 5,000 Units  5,000 Units Subcutaneous Once JohnathanAngela, DO   5,000 Units at 24 0652    [COMPLETED] phenazopyridine (Pyridum) tab 200 mg  200 mg Oral Once JohnathanAngela, DO   200 mg at 24 0651    [COMPLETED] ceFAZolin (Ancef) 2 g in 20mL IV syringe premix  2 g Intravenous Once Steffi Yi, DO   2 g at 24 1212    [COMPLETED] acetaminophen (Tylenol Extra Strength) tab 1,000 mg  1,000 mg Oral Once JohnathanAngela, DO   1,000 mg at 24 1536     Current Outpatient Medications on File Prior to Encounter   Medication Sig Dispense Refill    [] Ketorolac Tromethamine 10 MG Oral Tab Take 1 tablet (10 mg total) by mouth every 6 (six) hours as needed. 20 tablet 0    traMADol 50 MG Oral Tab Take 1 tablet (50 mg total) by mouth every 4 to 6 hours as needed for Pain (Can alternate with Oxycodone). Do not take along with oxycodone or norco (hydrocodone) - separate by 2 hours if needed. 40 tablet 0    lisinopril-hydroCHLOROthiazide 20-12.5 MG Oral Tab Take 1 tablet by mouth once daily. 90 tablet 0    Lovastatin 10 MG Oral Tab TAKE ONE TABLET BY MOUTH  NIGHTLY 90 tablet 0    ergocalciferol 1.25 MG (14285 UT) Oral Cap Take 1 capsule (50,000 Units total) by mouth once a week. 6 capsule 0    acetaminophen 325 MG Oral Tab Take 1 tablet (325 mg total) by mouth every 6 (six) hours as needed for Pain.      celecoxib 200 MG Oral Cap Take 1 capsule (200 mg total) by mouth daily.       Review of Systems:   A comprehensive review of systems was completed.    Pertinent positives and negatives noted in the HPI.    Objective:   Physical Exam:    /66 (BP Location: Left arm)   Pulse 61   Temp 97.8 °F (36.6 °C) (Oral)   Resp 17   Wt 195 lb 1.7 oz (88.5 kg)   LMP  (LMP Unknown)   SpO2 96%   BMI 38.10 kg/m²   General: No acute distress, awake and alert  Respiratory: No rhonchi, no wheezes  Cardiovascular: S1, S2. Regular rate and rhythm  Abdomen: Soft, Non-tender, non-distended, positive bowel sounds  Neuro: LOPEZ x 4  Extremities: No edema    Results:    Labs:      Labs Last 24 Hours:  Recent Labs   Lab 05/11/24 2104   RBC 4.48   HGB 13.8   HCT 41.7   MCV 93.1   MCH 30.8   MCHC 33.1   RDW 14.3   NEPRELIM 6.90   WBC 10.1   .0     Recent Labs   Lab 05/11/24 2104   *   BUN 16   CREATSERUM 0.61   EGFRCR 93   CA 9.8   ALB 3.3*      K 4.3      CO2 24.0   ALKPHO 58   AST 18   ALT 20   BILT 0.5   TP 7.5     Lab Results   Component Value Date    INR 0.95 02/21/2022    INR 0.9 12/28/2021     Recent Labs   Lab 05/11/24 2104   TROPHS 5     Recent Labs   Lab 05/11/24 2104   PBNP 183     No results for input(s): \"PCT\" in the last 168 hours.    Imaging: Imaging data reviewed in Epic.    Assessment & Plan:      #Right upper lobe PE likely provoked in the setting of recent surgery  -Heparin gtt  -Check lower extremity Dopplers  -Echo    #Hypertension  -Hold home lisinopril-hydrochlorothiazide for now, resume pending BP trend    #Hyperlipidemia  -Statin    #Recent hysterectomy with bilateral salpingectomy      Plan of care discussed with patient, family,  JAYLAN.    Alireza Ball, DO    Supplementary Documentation:     The 21st Century Cures Act makes medical notes like these available to patients in the interest of transparency. Please be advised this is a medical document. Medical documents are intended to carry relevant information, facts as evident, and the clinical opinion of the practitioner. The medical note is intended as peer to peer communication and may appear blunt or direct. It is written in medical language and may contain abbreviations or verbiage that are unfamiliar.               **Certification    PHYSICIAN Certification of Need for Inpatient Hospitalization - Initial Certification    Patient will require inpatient services that will reasonably be expected to span two midnight's based on the clinical documentation in H+P.   Based on patients current state of illness, I anticipate that, after discharge, patient will require TBD.

## 2024-05-12 NOTE — ED PROVIDER NOTES
Patient Seen in: [facility] [place]      History     Chief Complaint   Patient presents with    Post-Op    Difficulty Breathing     Stated Complaint: Pt with hysterctomy on Monday and yesterday started with SOB.    Subjective:   HPI    73-year-old woman, 6 days out from hysterectomy, here for evaluation of 2 days of exertional shortness of breath.  States for the first few days after the operation was mainly on the first floor of her house, over the past 2 days however has been going up and down the stairs noticed she seems more short of breath.  Denies any chest pain any fevers any new cough or cold symptoms, any other complaints at this time.  Denies any bleeding, abdominal pain is controlled is no other complaints at this time.    Objective:   Past Medical History:    Essential hypertension    High blood pressure    High cholesterol    Osteoarthritis    Other acute pulmonary embolism without acute cor pulmonale (HCC)              Past Surgical History:   Procedure Laterality Date    Colonoscopy      Other surgical history      Oral tooth extraction surgery     Total abdom hysterectomy                  Social History     Socioeconomic History    Marital status:    Tobacco Use    Smoking status: Never    Smokeless tobacco: Never   Substance and Sexual Activity    Alcohol use: No     Alcohol/week: 0.0 standard drinks of alcohol    Drug use: No              Review of Systems    Positive for stated complaint: Pt with hysterctomy on Monday and yesterday started with SOB.  Other systems are as noted in HPI.  Constitutional and vital signs reviewed.      All other systems reviewed and negative except as noted above.    Physical Exam     ED Triage Vitals [05/11/24 2035]   /68   Pulse 72   Resp 20   Temp 97.6 °F (36.4 °C)   Temp src Temporal   SpO2 99 %   O2 Device None (Room air)       Current Vitals:   Vital Signs  BP: 131/51  Pulse: 70  Resp: 20  Temp: 97.6 °F (36.4 °C)  Temp src:  Temporal    Oxygen Therapy  SpO2: 98 %  O2 Device: None (Room air)            Physical Exam        Physical Exam  Vitals signs and nursing note reviewed.   General: Well-appearing older woman sitting up in the bed in no acute distress.  Head: Normocephalic and atraumatic.   HEENT:  Mucous membranes are moist.   Cardiovascular:  Normal rate and regular rhythm.  No Edema  Pulmonary:  Pulmonary effort is normal.  Normal breath sounds. No wheezing, rhonchi or rales.   Abdominal: Abdomen is soft, mild diffuse abdominal tenderness no guarding or rebound tenderness.  Incisions with tissue adhesive intact  Skin: Warm and dry  Neurological: Awake alert, speech is normal        ED Course     Labs Reviewed   COMP METABOLIC PANEL (14) - Abnormal; Notable for the following components:       Result Value    Glucose 104 (*)     Albumin 3.3 (*)     A/G Ratio 0.8 (*)     All other components within normal limits   D-DIMER - Abnormal; Notable for the following components:    D-Dimer 5.85 (*)     All other components within normal limits   TROPONIN I HIGH SENSITIVITY - Normal   CBC WITH DIFFERENTIAL WITH PLATELET    Narrative:     The following orders were created for panel order CBC With Differential With Platelet.  Procedure                               Abnormality         Status                     ---------                               -----------         ------                     CBC W/ DIFFERENTIAL[[id number]]                              Final result                 Please view results for these tests on the individual orders.   PTT, ACTIVATED   PRO BETA NATRIURETIC PEPTIDE   RAINBOW DRAW BLUE   CBC W/ DIFFERENTIAL     EKG    Rate, intervals and axes as noted on EKG Report.  Rate: 66  Rhythm: Sinus Rhythm  Reading: No acute ischemic                 XR CHEST AP PORTABLE  (CPT=71045)    Result Date: 5/11/2024  PROCEDURE:  XR CHEST AP PORTABLE  (CPT=71045)  TECHNIQUE:  AP chest radiograph was obtained.  COMPARISON:  None.   INDICATIONS:  Pt with hysterctomy on Monday and yesterday started with SOB.  PATIENT STATED HISTORY: (As transcribed by Technologist)  2 day history shortness of breath status post hysterectomy 05/06/24    FINDINGS:  The cardiomediastinal silhouette is within normal limits.  Chronic interstitial changes are noted in the lungs.  There is no consolidation, effusion, or pneumothorax.  No aggressive osseous lesions are identified.            CONCLUSION: No acute cardiopulmonary abnormality.   LOCATION:  [facility]      Dictated by (CST): Quinn Carrion MD on 5/11/2024 at 10:43 PM     Finalized by (CST): Quinn Carrion MD on 5/11/2024 at 10:43 PM           CT abdomen pelvis shows right upper lobe pulmonary embolism no right heart strain    MDM      This is a 73-year-old woman here for evaluation of exertional dyspnea, 6 days out from laparoscopic hysterectomy.  Differential includes ACS pulmonary embolism, anemia, pneumonia.  She is afebrile hemodynamically stable.  CT of the chest shows acute pulmonary embolism in the right upper lobe.  No obvious right heart strain.  Troponin is negative, patient is hemodynamically stable blood pressure 130s over 50s heart rate in the 70s.  Heparin drip started, patient will be admitted to the hospitalist for further evaluation and treatment Case endorsed to [facility] hospitalist who agrees with plan for admission for    I independently viewed and interpreted the following imaging: Chest x-ray without acute infiltrate she is  Admission disposition: 5/12/2024 12:57 AM                                        Medical Decision Making      Disposition and Plan     Clinical Impression:  1. Other acute pulmonary embolism without acute cor pulmonale (HCC)    2. Exertional dyspnea         Disposition:  Admit  5/12/2024 12:57 am    Follow-up:  No follow-up provider specified.        Medications Prescribed:  Current Discharge Medication List                            Hospital Problems       Present on  Admission  Date Reviewed: 3/11/2022            ICD-10-CM Noted POA    * (Principal) Other acute pulmonary embolism without acute cor pulmonale (HCC) I26.99 5/12/2024 Unknown    Azotemia R79.89 5/12/2024 Yes    Hyperglycemia R73.9 5/12/2024 Yes

## 2024-05-12 NOTE — ED QUICK NOTES
Orders for admission, patient is aware of plan and ready to go upstairs. Any questions, please call ED RN Jyoti at extension [phone].     Patient Covid vaccination status: Fully vaccinated     COVID Test Ordered in ED: None    COVID Suspicion at Admission: N/A    Running Infusions:    continuous dose heparin          Mental Status/LOC at time of transport: A&O x4    Other pertinent information:   CIWA score: N/A   NIH score:  N/A

## 2024-05-12 NOTE — PLAN OF CARE
Assumed care at 1500  Orientated x4, NSR, RA   Denies pain     Heparin monitored and maintained   BP within parameters   Up to chair   Needs met     Plan for ECHO, US of lower extremities

## 2024-05-12 NOTE — ED INITIAL ASSESSMENT (HPI)
Patient arrives from home with c/o generalized weakness and SOB s/p hysterectomy and bladder repair on Monday

## 2024-05-13 ENCOUNTER — APPOINTMENT (OUTPATIENT)
Dept: ULTRASOUND IMAGING | Facility: HOSPITAL | Age: 74
DRG: 300 | End: 2024-05-13
Attending: INTERNAL MEDICINE

## 2024-05-13 ENCOUNTER — APPOINTMENT (OUTPATIENT)
Dept: CV DIAGNOSTICS | Facility: HOSPITAL | Age: 74
DRG: 300 | End: 2024-05-13
Attending: INTERNAL MEDICINE

## 2024-05-13 VITALS
WEIGHT: 195.13 LBS | RESPIRATION RATE: 16 BRPM | BODY MASS INDEX: 38 KG/M2 | SYSTOLIC BLOOD PRESSURE: 146 MMHG | OXYGEN SATURATION: 96 % | TEMPERATURE: 99 F | HEART RATE: 84 BPM | DIASTOLIC BLOOD PRESSURE: 63 MMHG

## 2024-05-13 LAB
ANION GAP SERPL CALC-SCNC: 5 MMOL/L (ref 0–18)
APTT PPP: 109.5 SECONDS (ref 23–36)
APTT PPP: 97.9 SECONDS (ref 23–36)
BASOPHILS # BLD AUTO: 0.07 X10(3) UL (ref 0–0.2)
BASOPHILS NFR BLD AUTO: 0.6 %
BUN BLD-MCNC: 11 MG/DL (ref 9–23)
CALCIUM BLD-MCNC: 9.3 MG/DL (ref 8.5–10.1)
CHLORIDE SERPL-SCNC: 113 MMOL/L (ref 98–112)
CO2 SERPL-SCNC: 23 MMOL/L (ref 21–32)
CREAT BLD-MCNC: 0.56 MG/DL
EGFRCR SERPLBLD CKD-EPI 2021: 95 ML/MIN/1.73M2 (ref 60–?)
EOSINOPHIL # BLD AUTO: 0.49 X10(3) UL (ref 0–0.7)
EOSINOPHIL NFR BLD AUTO: 4.5 %
ERYTHROCYTE [DISTWIDTH] IN BLOOD BY AUTOMATED COUNT: 14.2 %
GLUCOSE BLD-MCNC: 108 MG/DL (ref 70–99)
HCT VFR BLD AUTO: 40.5 %
HGB BLD-MCNC: 13 G/DL
IMM GRANULOCYTES # BLD AUTO: 0.04 X10(3) UL (ref 0–1)
IMM GRANULOCYTES NFR BLD: 0.4 %
LYMPHOCYTES # BLD AUTO: 2.06 X10(3) UL (ref 1–4)
LYMPHOCYTES NFR BLD AUTO: 18.8 %
MCH RBC QN AUTO: 31.3 PG (ref 26–34)
MCHC RBC AUTO-ENTMCNC: 32.1 G/DL (ref 31–37)
MCV RBC AUTO: 97.6 FL
MONOCYTES # BLD AUTO: 0.9 X10(3) UL (ref 0.1–1)
MONOCYTES NFR BLD AUTO: 8.2 %
NEUTROPHILS # BLD AUTO: 7.37 X10 (3) UL (ref 1.5–7.7)
NEUTROPHILS # BLD AUTO: 7.37 X10(3) UL (ref 1.5–7.7)
NEUTROPHILS NFR BLD AUTO: 67.5 %
OSMOLALITY SERPL CALC.SUM OF ELEC: 292 MOSM/KG (ref 275–295)
PLATELET # BLD AUTO: 185 10(3)UL (ref 150–450)
POTASSIUM SERPL-SCNC: 4.1 MMOL/L (ref 3.5–5.1)
RBC # BLD AUTO: 4.15 X10(6)UL
SODIUM SERPL-SCNC: 141 MMOL/L (ref 136–145)
WBC # BLD AUTO: 10.9 X10(3) UL (ref 4–11)

## 2024-05-13 PROCEDURE — 93970 EXTREMITY STUDY: CPT | Performed by: INTERNAL MEDICINE

## 2024-05-13 PROCEDURE — 93306 TTE W/DOPPLER COMPLETE: CPT | Performed by: INTERNAL MEDICINE

## 2024-05-13 PROCEDURE — 99239 HOSP IP/OBS DSCHRG MGMT >30: CPT | Performed by: STUDENT IN AN ORGANIZED HEALTH CARE EDUCATION/TRAINING PROGRAM

## 2024-05-13 RX ORDER — IPRATROPIUM BROMIDE AND ALBUTEROL SULFATE 2.5; .5 MG/3ML; MG/3ML
3 SOLUTION RESPIRATORY (INHALATION) EVERY 6 HOURS PRN
Status: DISCONTINUED | OUTPATIENT
Start: 2024-05-13 | End: 2024-05-13

## 2024-05-13 RX ORDER — ALBUTEROL SULFATE 90 UG/1
2 AEROSOL, METERED RESPIRATORY (INHALATION) EVERY 6 HOURS PRN
Qty: 1 EACH | Refills: 0 | Status: SHIPPED | OUTPATIENT
Start: 2024-05-13

## 2024-05-13 RX ORDER — ALBUTEROL SULFATE 90 UG/1
2 AEROSOL, METERED RESPIRATORY (INHALATION) EVERY 6 HOURS PRN
Qty: 1 EACH | Refills: 0 | Status: SHIPPED | OUTPATIENT
Start: 2024-05-13 | End: 2024-05-13

## 2024-05-13 NOTE — PLAN OF CARE
Assumed care at 1930  Alert and oriented x4   NSR on tele and on RA  Denies any pain/discomfort. Pt educated on importance of letting RN know about pain, but pt still declining prn.     SOB per fam, prn neb tx  Heparin gtt infusing and titrated per protocol   Fam member at bedside   Call light w/in reach

## 2024-05-13 NOTE — PAYOR COMM NOTE
--------------  ADMISSION REVIEW     Secondary Payor: [facility]  Subscriber #:  [id number]  Authorization Number: [id number]    Admit date: 5/12/24  Admit time:  3:20 AM       REVIEW DOCUMENTATION:     ED Provider Notes        ED Provider Notes signed by Martin Covarrubias MD at 5/12/2024  1:22 AM       Author: Martin Covarrubias MD Service: -- Author Type: Physician    Filed: 5/12/2024  1:22 AM Date of Service: 5/12/2024  1:20 AM Status: Signed    : Martin Covarrubias MD (Physician)           Patient Seen in: [facility] In [place]      History     Chief Complaint   Patient presents with    Post-Op    Difficulty Breathing     Stated Complaint: Pt with hysterctomy on Monday and yesterday started with SOB.    Subjective:   HPI    73-year-old woman, 6 days out from hysterectomy, here for evaluation of 2 days of exertional shortness of breath.  States for the first few days after the operation was mainly on the first floor of her house, over the past 2 days however has been going up and down the stairs noticed she seems more short of breath.  Denies any chest pain any fevers any new cough or cold symptoms, any other complaints at this time.  Denies any bleeding, abdominal pain is controlled is no other complaints at this time.    Objective:   Past Medical History:    Essential hypertension    High blood pressure    High cholesterol    Osteoarthritis    Other acute pulmonary embolism without acute cor pulmonale (HCC)              Past Surgical History:   Procedure Laterality Date    Colonoscopy      Other surgical history      Oral tooth extraction surgery     Total abdom hysterectomy                  Social History     Socioeconomic History    Marital status:    Tobacco Use    Smoking status: Never    Smokeless tobacco: Never   Substance and Sexual Activity    Alcohol use: No     Alcohol/week: 0.0 standard drinks of alcohol    Drug use: No              Review of Systems    Positive for stated  complaint: Pt with hysterctomy on Monday and yesterday started with SOB.  Other systems are as noted in HPI.  Constitutional and vital signs reviewed.      All other systems reviewed and negative except as noted above.    Physical Exam     ED Triage Vitals [05/11/24 2035]   /68   Pulse 72   Resp 20   Temp 97.6 °F (36.4 °C)   Temp src Temporal   SpO2 99 %   O2 Device None (Room air)       Current Vitals:   Vital Signs  BP: 131/51  Pulse: 70  Resp: 20  Temp: 97.6 °F (36.4 °C)  Temp src: Temporal    Oxygen Therapy  SpO2: 98 %  O2 Device: None (Room air)            Physical Exam        Physical Exam  Vitals signs and nursing note reviewed.   General: Well-appearing older woman sitting up in the bed in no acute distress.  Head: Normocephalic and atraumatic.   HEENT:  Mucous membranes are moist.   Cardiovascular:  Normal rate and regular rhythm.  No Edema  Pulmonary:  Pulmonary effort is normal.  Normal breath sounds. No wheezing, rhonchi or rales.   Abdominal: Abdomen is soft, mild diffuse abdominal tenderness no guarding or rebound tenderness.  Incisions with tissue adhesive intact  Skin: Warm and dry  Neurological: Awake alert, speech is normal        ED Course     Labs Reviewed   COMP METABOLIC PANEL (14) - Abnormal; Notable for the following components:       Result Value    Glucose 104 (*)     Albumin 3.3 (*)     A/G Ratio 0.8 (*)     All other components within normal limits   D-DIMER - Abnormal; Notable for the following components:    D-Dimer 5.85 (*)     All other components within normal limits   TROPONIN I HIGH SENSITIVITY - Normal   CBC WITH DIFFERENTIAL WITH PLATELET    Narrative:     The following orders were created for panel order CBC With Differential With Platelet.  Procedure                               Abnormality         Status                     ---------                               -----------         ------                     CBC W/ DIFFERENTIAL[[id number]]                               Final result                 Please view results for these tests on the individual orders.   PTT, ACTIVATED   PRO BETA NATRIURETIC PEPTIDE   RAINBOW DRAW BLUE   CBC W/ DIFFERENTIAL     EKG    Rate, intervals and axes as noted on EKG Report.  Rate: 66  Rhythm: Sinus Rhythm  Reading: No acute ischemic                 XR CHEST AP PORTABLE  (CPT=71045)    Result Date: 5/11/2024  PROCEDURE:  XR CHEST AP PORTABLE  (CPT=71045)  TECHNIQUE:  AP chest radiograph was obtained.  COMPARISON:  None.  INDICATIONS:  Pt with hysterctomy on Monday and yesterday started with SOB.  PATIENT STATED HISTORY: (As transcribed by Technologist)  2 day history shortness of breath status post hysterectomy 05/06/24    FINDINGS:  The cardiomediastinal silhouette is within normal limits.  Chronic interstitial changes are noted in the lungs.  There is no consolidation, effusion, or pneumothorax.  No aggressive osseous lesions are identified.            CONCLUSION: No acute cardiopulmonary abnormality.   LOCATION:  [place]      Dictated by (CST): Quinn Carrion MD on 5/11/2024 at 10:43 PM     Finalized by (CST): Quinn Carrion MD on 5/11/2024 at 10:43 PM          CT abdomen pelvis shows right upper lobe pulmonary embolism no right heart strain    MDM      This is a 73-year-old woman here for evaluation of exertional dyspnea, 6 days out from laparoscopic hysterectomy.  Differential includes ACS pulmonary embolism, anemia, pneumonia.  She is afebrile hemodynamically stable.  CT of the chest shows acute pulmonary embolism in the right upper lobe.  No obvious right heart strain.  Troponin is negative, patient is hemodynamically stable blood pressure 130s over 50s heart rate in the 70s.  Heparin drip started, patient will be admitted to the hospitalist for further evaluation and treatment Case endorsed to [place] hospitalist who agrees with plan for admission for    I independently viewed and interpreted the following imaging: Chest x-ray without acute  infiltrate she is  Admission disposition: 5/12/2024 12:57 AM                                        Medical Decision Making      Disposition and Plan     Clinical Impression:  1. Other acute pulmonary embolism without acute cor pulmonale (HCC)    2. Exertional dyspnea         Disposition:  Admit  5/12/2024 12:57 am    Follow-up:  No follow-up provider specified.        Medications Prescribed:  Current Discharge Medication List                            Hospital Problems       Present on Admission  Date Reviewed: 3/11/2022            ICD-10-CM Noted POA    * (Principal) Other acute pulmonary embolism without acute cor pulmonale (HCC) I26.99 5/12/2024 Unknown    Azotemia R79.89 5/12/2024 Yes    Hyperglycemia R73.9 5/12/2024 Yes                     Signed by Martin Covarrubias MD on 5/12/2024  1:22 AM     History and Physical   Assessment & Plan:  #Right upper lobe PE likely provoked in the setting of recent surgery  -Heparin gtt  -Check lower extremity Dopplers  -Echo     #Hypertension  -Hold home lisinopril-hydrochlorothiazide for now, resume pending BP trend     #Hyperlipidemia  -Statin     #Recent hysterectomy with bilateral salpingectomy   Hospitalist   Brief progress note:     Pt seen with family at bedside serving as . She feels SOB improving, denies CP at this time. No hx smoking, prior lung disease, environmental exposure. Discussed results of CT imaging .     PE:   Gen: NAD   CV : RRR   Pulm : CTAB   Abd: Soft, NT, ND. Surgical incisions well approximated   Ext: WWP, no edema      A/P:     # PE   - TTE, LE dopplers pending   - Heparin ggt continued, apixiban at dc, sent to pharmacy for pricing, SW consulted   - PCP follow-up for duration of AC, provoked PE     # Ground glass findings on CT    - d/w pt and family   - Pulm f/u as outpt for repeat High res CT       MEDICATIONS ADMINISTERED IN LAST 1 DAY:  heparin (Porcine) 91889 units/250mL infusion PE/DVT/THROMBUS CONTINUOUS       Date Action Dose Route  User    5/13/2024 0534 Hi-Risk Rate/Dose Change 1,200 Units/hr Intravenous Yari Kauffman, RN    5/12/2024 1842 New Bag 1,350 Units/hr Intravenous Gifty Gonzalez RN    5/12/2024 1148 Hi-Risk Rate/Dose Change 1,350 Units/hr Intravenous Dulce Cummings, RN          ipratropium-albuterol (Duoneb) 0.5-2.5 (3) MG/3ML inhalation solution 3 mL       Date Action Dose Route User    5/13/2024 0056 Given 3 mL Nebulization Benita Robison          pravastatin (Pravachol) tab 10 mg       Date Action Dose Route User    5/12/2024 2143 Given 10 mg Oral Yari Kauffman RN            Vitals (last day)       Date/Time Temp Pulse Resp BP SpO2 Weight O2 Device O2 Flow Rate (L/min) Who    05/13/24 0757 -- -- -- -- -- -- Nasal cannula 2 L/min KS    05/13/24 0730 97.3 °F (36.3 °C) 63 15 131/59 100 % -- Nasal cannula 2 L/min JR    05/13/24 0530 97.6 °F (36.4 °C) 59 18 146/59 97 % -- Nasal cannula 2 L/min RP          CIWA Scores (since admission)       None          PLEASE FAX DAYS CERTIFIED AND NEXT REVIEW DATE [phone]

## 2024-05-13 NOTE — PLAN OF CARE
Assumed care at 0730  Orientated x4, NSR, RA   Denies pain      Heparin monitored and maintained   BP within parameters   ECHO and US completed   Up to chair   Needs met      Plan for discharge on eliquis if cleared by consults         NURSING DISCHARGE NOTE    Discharged Home via Wheelchair.  Accompanied by Family member and Support staff  Belongings Taken by patient/family.    All consults cleared for discharge   Patient and family member educated on discharge paperwork   Medications delivered by pharmacy   No further questions from patient

## 2024-05-13 NOTE — PAYOR COMM NOTE
--------------  CONTINUED STAY REVIEW    Payor: KACIE MOE  Subscriber #:  [id number]  Authorization Number: [id number]    Admit date: 24  Admit time:  3:20 AM    REVIEW DOCUMENTATION:    Hospitalist Progress Note            Dolores Finn Patient Status:  Inpatient    1950 MRN [id number]   [facility] 7NE-A Attending Jose Ball, *   Hosp Day # 1 PCP No primary care provider on file.      Chief Complaint: SOB        Subjective:  - Pt feeling improved, no longer having SOB, states duonebs overnight improved her symptoms   - Denies other f/c, cp, abd pain, n/v/d            Objective:  Review of Systems:   A comprehensive review of systems was completed; pertinent positive and negatives stated in subjective.     Vital signs:  Temp:  [97.3 °F (36.3 °C)-98.4 °F (36.9 °C)] 97.3 °F (36.3 °C)  Pulse:  [59-76] 63  Resp:  [11-22] 15  BP: (109-146)/(59-75) 131/59  SpO2:  [91 %-100 %] 100 %     Physical Exam:    General: No acute distress  Respiratory: no wheezes, no rhonchi  Cardiovascular: S1, S2, regular rate and rhythm  Abdomen: Soft, Non-tender, non-distended, positive bowel sounds  Neuro: No new focal deficits.   Extremities: no edema     Diagnostic Data:    Labs:       Recent Labs   Lab 24  0452   WBC 10.1 10.9   HGB 13.8 13.0   MCV 93.1 97.6   .0 185.0              Recent Labs   Lab 24  0452   * 108*   BUN 16 11   CREATSERUM 0.61 0.56   CA 9.8 9.3   ALB 3.3*  --     141   K 4.3 4.1    113*   CO2 24.0 23.0   ALKPHO 58  --    AST 18  --    ALT 20  --    BILT 0.5  --    TP 7.5  --          Estimated Creatinine Clearance: 62.3 mL/min (based on SCr of 0.56 mg/dL).         Recent Labs   Lab 05/11/24  2104   TROPHS 5         No results for input(s): \"PTP\", \"INR\" in the last 168 hours.                 Microbiology     No results found for this visit on 24.        Imaging: Reviewed in Epic.     Medications:    Scheduled Medications[]Expand by Default    pravastatin  10 mg Oral Nightly                  Assessment & Plan:  # PE   - TTE with RV strain, LE dopplers with bilateral DVT suggesting etiology of PE   - Heparin ggt transition to apixiban at dc   - PCP follow-up for duration of AC, provoked PE     # Ground glass findings on CT    - d/w pt and family, no hx fevers/chills or other infectious symptoms   - No wheezing or crackles on exam   - Pulm f/u as outpt for repeat High res CT   - Albuterol PRN at IA            MEDICATIONS ADMINISTERED IN LAST 1 DAY:  heparin (Porcine) 33666 units/250mL infusion PE/DVT/THROMBUS CONTINUOUS       Date Action Dose Route User    5/13/2024 1346 New Bag 1,200 Units/hr Intravenous Gifty Gonzalez RN    5/13/2024 0534 Hi-Risk Rate/Dose Change 1,200 Units/hr Intravenous Yari Kauffman RN    5/12/2024 1842 New Bag 1,350 Units/hr Intravenous Gifty Gonzalez RN          ipratropium-albuterol (Duoneb) 0.5-2.5 (3) MG/3ML inhalation solution 3 mL       Date Action Dose Route User    5/13/2024 0056 Given 3 mL Nebulization Benita Robison          pravastatin (Pravachol) tab 10 mg       Date Action Dose Route User    5/12/2024 2143 Given 10 mg Oral Yari Kauffman RN            Vitals (last day)       Date/Time Temp Pulse Resp BP SpO2 Weight O2 Device O2 Flow Rate (L/min) Who    05/13/24 0757 -- -- -- -- -- -- Nasal cannula 2 L/min KS    05/13/24 0730 97.3 °F (36.3 °C) 63 15 131/59 100 % -- Nasal cannula 2 L/min JR    05/13/24 0530 97.6 °F (36.4 °C) 59 18 146/59 97 % -- Nasal cannula 2 L/min RP          CIWA Scores (since admission)       None          PLEASE FAX DAYS CERTIFIED AND NEXT REVIEW DATE [phone]

## 2024-05-13 NOTE — CM/SW NOTE
SW notified by RN of pt request for HHC at [place]. Referral sent in [facility]. Orders entered for Eliquis at [facility], RN called pt's pharmacy. Price is $47/month, provided 30 day free coupon to RN.     Awaiting response on HH providers, pt can dc prior to confirming services. Will have HH providers follow up with pt/family.    Addendum: [facility] Caregivers for HHC at [place]. Notified RN.       MASOOD Pedroza

## 2024-05-13 NOTE — PAYOR COMM NOTE
--------------  ADMISSION REVIEW     Payor: KACIE MOE  Subscriber #:  [id number]  Authorization Number: [id number]    Admit date: 5/12/24  Admit time:  3:20 AM       REVIEW DOCUMENTATION:     ED Provider Notes        ED Provider Notes signed by Martin Covarrubias MD at 5/12/2024  1:22 AM       Author: Martin Covarrubias MD Service: -- Author Type: Physician    Filed: 5/12/2024  1:22 AM Date of Service: 5/12/2024  1:20 AM Status: Signed    : Martin Covarrubias MD (Physician)           Patient Seen in: [facility] In [place]      History     Chief Complaint   Patient presents with    Post-Op    Difficulty Breathing     Stated Complaint: Pt with hysterctomy on Monday and yesterday started with SOB.    Subjective:   HPI    73-year-old woman, 6 days out from hysterectomy, here for evaluation of 2 days of exertional shortness of breath.  States for the first few days after the operation was mainly on the first floor of her house, over the past 2 days however has been going up and down the stairs noticed she seems more short of breath.  Denies any chest pain any fevers any new cough or cold symptoms, any other complaints at this time.  Denies any bleeding, abdominal pain is controlled is no other complaints at this time.    Objective:   Past Medical History:    Essential hypertension    High blood pressure    High cholesterol    Osteoarthritis    Other acute pulmonary embolism without acute cor pulmonale (HCC)              Past Surgical History:   Procedure Laterality Date    Colonoscopy      Other surgical history      Oral tooth extraction surgery     Total abdom hysterectomy                  Social History     Socioeconomic History    Marital status:    Tobacco Use    Smoking status: Never    Smokeless tobacco: Never   Substance and Sexual Activity    Alcohol use: No     Alcohol/week: 0.0 standard drinks of alcohol    Drug use: No              Review of Systems    Positive for stated complaint: Pt  with hysterctomy on Monday and yesterday started with SOB.  Other systems are as noted in HPI.  Constitutional and vital signs reviewed.      All other systems reviewed and negative except as noted above.    Physical Exam     ED Triage Vitals [05/11/24 2035]   /68   Pulse 72   Resp 20   Temp 97.6 °F (36.4 °C)   Temp src Temporal   SpO2 99 %   O2 Device None (Room air)       Current Vitals:   Vital Signs  BP: 131/51  Pulse: 70  Resp: 20  Temp: 97.6 °F (36.4 °C)  Temp src: Temporal    Oxygen Therapy  SpO2: 98 %  O2 Device: None (Room air)            Physical Exam        Physical Exam  Vitals signs and nursing note reviewed.   General: Well-appearing older woman sitting up in the bed in no acute distress.  Head: Normocephalic and atraumatic.   HEENT:  Mucous membranes are moist.   Cardiovascular:  Normal rate and regular rhythm.  No Edema  Pulmonary:  Pulmonary effort is normal.  Normal breath sounds. No wheezing, rhonchi or rales.   Abdominal: Abdomen is soft, mild diffuse abdominal tenderness no guarding or rebound tenderness.  Incisions with tissue adhesive intact  Skin: Warm and dry  Neurological: Awake alert, speech is normal        ED Course     Labs Reviewed   COMP METABOLIC PANEL (14) - Abnormal; Notable for the following components:       Result Value    Glucose 104 (*)     Albumin 3.3 (*)     A/G Ratio 0.8 (*)     All other components within normal limits   D-DIMER - Abnormal; Notable for the following components:    D-Dimer 5.85 (*)     All other components within normal limits   TROPONIN I HIGH SENSITIVITY - Normal   CBC WITH DIFFERENTIAL WITH PLATELET    Narrative:     The following orders were created for panel order CBC With Differential With Platelet.  Procedure                               Abnormality         Status                     ---------                               -----------         ------                     CBC W/ DIFFERENTIAL[[id number]]                              Final result                  Please view results for these tests on the individual orders.   PTT, ACTIVATED   PRO BETA NATRIURETIC PEPTIDE   RAINBOW DRAW BLUE   CBC W/ DIFFERENTIAL     EKG    Rate, intervals and axes as noted on EKG Report.  Rate: 66  Rhythm: Sinus Rhythm  Reading: No acute ischemic                 XR CHEST AP PORTABLE  (CPT=71045)    Result Date: 5/11/2024  PROCEDURE:  XR CHEST AP PORTABLE  (CPT=71045)  TECHNIQUE:  AP chest radiograph was obtained.  COMPARISON:  None.  INDICATIONS:  Pt with hysterctomy on Monday and yesterday started with SOB.  PATIENT STATED HISTORY: (As transcribed by Technologist)  2 day history shortness of breath status post hysterectomy 05/06/24    FINDINGS:  The cardiomediastinal silhouette is within normal limits.  Chronic interstitial changes are noted in the lungs.  There is no consolidation, effusion, or pneumothorax.  No aggressive osseous lesions are identified.            CONCLUSION: No acute cardiopulmonary abnormality.   LOCATION:  [place]      Dictated by (CST): Quinn Carrion MD on 5/11/2024 at 10:43 PM     Finalized by (CST): Quinn Carrion MD on 5/11/2024 at 10:43 PM          CT abdomen pelvis shows right upper lobe pulmonary embolism no right heart strain    MDM      This is a 73-year-old woman here for evaluation of exertional dyspnea, 6 days out from laparoscopic hysterectomy.  Differential includes ACS pulmonary embolism, anemia, pneumonia.  She is afebrile hemodynamically stable.  CT of the chest shows acute pulmonary embolism in the right upper lobe.  No obvious right heart strain.  Troponin is negative, patient is hemodynamically stable blood pressure 130s over 50s heart rate in the 70s.  Heparin drip started, patient will be admitted to the hospitalist for further evaluation and treatment Case endorsed to [place] hospitalist who agrees with plan for admission for    I independently viewed and interpreted the following imaging: Chest x-ray without acute infiltrate she  is  Admission disposition: 5/12/2024 12:57 AM                                        Medical Decision Making      Disposition and Plan     Clinical Impression:  1. Other acute pulmonary embolism without acute cor pulmonale (HCC)    2. Exertional dyspnea         Disposition:  Admit  5/12/2024 12:57 am    Follow-up:  No follow-up provider specified.        Medications Prescribed:  Current Discharge Medication List                            Hospital Problems       Present on Admission  Date Reviewed: 3/11/2022            ICD-10-CM Noted POA    * (Principal) Other acute pulmonary embolism without acute cor pulmonale (HCC) I26.99 5/12/2024 Unknown    Azotemia R79.89 5/12/2024 Yes    Hyperglycemia R73.9 5/12/2024 Yes                     Signed by Martin Covarrubias MD on 5/12/2024  1:22 AM     History and Physical   Assessment & Plan:  #Right upper lobe PE likely provoked in the setting of recent surgery  -Heparin gtt  -Check lower extremity Dopplers  -Echo     #Hypertension  -Hold home lisinopril-hydrochlorothiazide for now, resume pending BP trend     #Hyperlipidemia  -Statin     #Recent hysterectomy with bilateral salpingectomy  Hospitalist   Brief progress note:     Pt seen with family at bedside serving as . She feels SOB improving, denies CP at this time. No hx smoking, prior lung disease, environmental exposure. Discussed results of CT imaging .     PE:   Gen: NAD   CV : RRR   Pulm : CTAB   Abd: Soft, NT, ND. Surgical incisions well approximated   Ext: WWP, no edema      A/P:     # PE   - TTE, LE dopplers pending   - Heparin ggt continued, apixiban at dc, sent to pharmacy for pricing, SW consulted   - PCP follow-up for duration of AC, provoked PE     # Ground glass findings on CT    - d/w pt and family, no hx fevers/chills or other infectious symptoms      - Pulm f/u as outpt for repeat High res CT     MEDICATIONS ADMINISTERED IN LAST 1 DAY:  heparin (Porcine) 42134 units/250mL infusion PE/DVT/THROMBUS  CONTINUOUS       Date Action Dose Route User    5/13/2024 1346 New Bag 1,200 Units/hr Intravenous Gifty Gonzalez RN    5/13/2024 0534 Hi-Risk Rate/Dose Change 1,200 Units/hr Intravenous Yari Kauffman RN    5/12/2024 1842 New Bag 1,350 Units/hr Intravenous Gifty Gonzalez RN          ipratropium-albuterol (Duoneb) 0.5-2.5 (3) MG/3ML inhalation solution 3 mL       Date Action Dose Route User    5/13/2024 0056 Given 3 mL Nebulization Benita Robison          pravastatin (Pravachol) tab 10 mg       Date Action Dose Route User    5/12/2024 2143 Given 10 mg Oral Yari Kauffman RN            Vitals (last day)       Date/Time Temp Pulse Resp BP SpO2 Weight O2 Device O2 Flow Rate (L/min) Who    05/13/24 0757 -- -- -- -- -- -- Nasal cannula 2 L/min KS    05/13/24 0730 97.3 °F (36.3 °C) 63 15 131/59 100 % -- Nasal cannula 2 L/min JR    05/13/24 0530 97.6 °F (36.4 °C) 59 18 146/59 97 % -- Nasal cannula 2 L/min RP          CIWA Scores (since admission)       None            PLEASE FAX DAYS CERTIFIED AND NEXT REVIEW DATE [phone]

## 2024-05-13 NOTE — PROGRESS NOTES
[facility]   part of [facility]     Hospitalist Progress Note     Dolores Finn Patient Status:  Inpatient    1950 MRN [id number]   Location [facility] 7NE-A Attending Quinn, Jose Coker, *   Hosp Day # 1 PCP No primary care provider on file.     Chief Complaint: SOB    Subjective:      - Pt feeling improved, no longer having SOB, states duonebs overnight improved her symptoms   - Denies other f/c, cp, abd pain, n/v/d     Objective:    Review of Systems:   A comprehensive review of systems was completed; pertinent positive and negatives stated in subjective.    Vital signs:  Temp:  [97.3 °F (36.3 °C)-98.4 °F (36.9 °C)] 97.3 °F (36.3 °C)  Pulse:  [59-76] 63  Resp:  [11-22] 15  BP: (109-146)/(59-75) 131/59  SpO2:  [91 %-100 %] 100 %    Physical Exam:    General: No acute distress  Respiratory: no wheezes, no rhonchi  Cardiovascular: S1, S2, regular rate and rhythm  Abdomen: Soft, Non-tender, non-distended, positive bowel sounds  Neuro: No new focal deficits.   Extremities: no edema    Diagnostic Data:    Labs:  Recent Labs   Lab 24  0452   WBC 10.1 10.9   HGB 13.8 13.0   MCV 93.1 97.6   .0 185.0       Recent Labs   Lab 24  0452   * 108*   BUN 16 11   CREATSERUM 0.61 0.56   CA 9.8 9.3   ALB 3.3*  --     141   K 4.3 4.1    113*   CO2 24.0 23.0   ALKPHO 58  --    AST 18  --    ALT 20  --    BILT 0.5  --    TP 7.5  --        Estimated Creatinine Clearance: 62.3 mL/min (based on SCr of 0.56 mg/dL).    Recent Labs   Lab 24   TROPHS 5       No results for input(s): \"PTP\", \"INR\" in the last 168 hours.               Microbiology    No results found for this visit on 24.      Imaging: Reviewed in Epic.    Medications:    pravastatin  10 mg Oral Nightly       Assessment & Plan:      # PE   - TTE with RV strain, LE dopplers with bilateral DVT suggesting etiology of PE   - Heparin ggt transition to apixiban at [place]   -  PCP follow-up for duration of AC, provoked PE     # Ground glass findings on CT    - d/w pt and family, no hx fevers/chills or other infectious symptoms   - No wheezing or crackles on exam   - Pulm f/u as outpt for repeat High res CT   - Albuterol PRN at dc     Home today    Jose Ball MD    Supplementary Documentation:     Quality:  DVT Mechanical Prophylaxis:   SCDs, Early ambuation  DVT Pharmacologic Prophylaxis   Medication    heparin (Porcine) 45897 units/250mL infusion PE/DVT/THROMBUS CONTINUOUS                Code Status: Not on file  Mora: No urinary catheter in place  Mora Duration (in days):   Central line:    SARATH: 5/13/2024    The 21st Century Cures Act makes medical notes like these available to patients in the interest of transparency. Please be advised this is a medical document. Medical documents are intended to carry relevant information, facts as evident, and the clinical opinion of the practitioner. The medical note is intended as peer to peer communication and may appear blunt or direct. It is written in medical language and may contain abbreviations or verbiage that are unfamiliar.

## 2024-05-14 ENCOUNTER — PATIENT OUTREACH (OUTPATIENT)
Dept: CASE MANAGEMENT | Age: 74
End: 2024-05-14

## 2024-05-14 NOTE — PROGRESS NOTES
TCM chart review.  No TCM as patient follows [facility].  Encounter closing.

## 2024-05-16 PROBLEM — I82.4Y3 ACUTE DEEP VEIN THROMBOSIS (DVT) OF PROXIMAL VEIN OF BOTH LOWER EXTREMITIES (HCC): Status: ACTIVE | Noted: 2024-05-16

## 2024-05-16 NOTE — DISCHARGE SUMMARY
[facility]     Dolores Finn Patient Status:  Inpatient    1950 MRN [id number]   Location [facility] 7NE-A Attending No att. providers found   Hosp Day # 1 PCP Edvin Tomlinson MD     Date of Admission: 2024  Date of Discharge: 2024  Discharge Disposition: Home Health Care Services    Admitting Diagnosis:   Exertional dyspnea [R06.09]  Other acute pulmonary embolism without acute cor pulmonale (HCC) [I26.99]    Hospital Discharge Diagnoses:   Acute PE, bilateral LE DVT  Ground glass findings on CT  Recent hysterectomy and bilateral salpingectomy  Hypertension  Hyperlipidemia    Lace+ Score: 40  59-90 High Risk  29-58 Medium Risk  0-28   Low Risk.    TCM Follow-Up Recommendation:  LACE 29-58: Moderate Risk of readmission after discharge from the hospital.    **Certification    Admission date was 2024.  Inpatient stay was shorter than expected.  Patient's Other acute pulmonary embolism without acute cor pulmonale (HCC) was initially serious enough to expect a more lengthy hospitalization but patient improved faster than expected.                 Discharge Diagnosis:   Acute PE and bilateral LE DVT    History of Present Illness: Dolores Finn is a 73 year old female with PMHx hypertension and hyperlipidemia who presents to the hospital for evaluation of shortness of breath.  She had hysterectomy with bilateral salpingectomy done last week due to incomplete uterovaginal prolapse.  She was doing fine initially after going home but was mainly on the first floor of the house.  Over the past day she has been ambulating more and going up and down the stairs.  She noticed that she was more short of breath with pleuritic chest pain.  She denies any fever, chills, cough, congestion, nausea, vomiting, diarrhea or pain elsewhere.  CTA chest showed acute PE in the right upper lobe with no obvious right heart strain.  Troponin is negative.  She was started on heparin drip and  admitted.     Brief Synopsis: CTA with findings of acute PE, started on IV heparin, lower extremity Dopplers completed also showing bilateral lower extremity DVT.  Likely provoked PE in setting of recent surgery, patient transition to apixaban at time of discharge.  TTE completed without right heart strain, EF 65 to 70%, no regional wall motion abnormalities, diastolic function is normal, no significant valvular abnormalities.  Patient also noted to have findings of mosaic attenuation with groundglass in the lungs, concerning for possible inflammatory process.  Recommended outpatient pulmonary follow-up for further evaluation with possible high-res CT.      Procedures during hospitalization:   None    Incidental or significant findings and recommendations (brief descriptions):  Acute PE and DVT, discharged on apixaban  Groundglass opacity seen on CT chest, recommended outpatient follow-up with pulm and possible high-res CT    Lab/Test results pending at Discharge:   None    Consultants:  None    Discharge Medication List:     Discharge Medications        START taking these medications        Instructions Prescription details   albuterol 108 (90 Base) MCG/ACT Aers  Commonly known as: Ventolin HFA      Inhale 2 puffs into the lungs every 6 (six) hours as needed for Wheezing or Shortness of Breath.   Quantity: 1 each  Refills: 0     apixaban 5 MG Tabs  Commonly known as: Eliquis      Take 2 tabs (10mg) by mouth twice daily for 7 days, then take 1 tab (5mg) by mouth twice daily thereafter.   Quantity: 74 tablet  Refills: 0            CONTINUE taking these medications        Instructions Prescription details   acetaminophen 325 MG Tabs  Commonly known as: Tylenol      Take 1 tablet (325 mg total) by mouth every 6 (six) hours as needed for Pain.   Refills: 0     celecoxib 200 MG Caps  Commonly known as: CeleBREX      Take 1 capsule (200 mg total) by mouth daily.   Refills: 0     ergocalciferol 1.25 MG (82102 UT)  Caps  Commonly known as: Vitamin D2  Notes to patient: Take as directed before      Take 1 capsule (50,000 Units total) by mouth once a week.   Quantity: 6 capsule  Refills: 0     lisinopril-hydroCHLOROthiazide 20-12.5 MG Tabs  Commonly known as: Zestoretic      Take 1 tablet by mouth once daily.   Quantity: 90 tablet  Refills: 0     Lovastatin 10 MG Tabs      TAKE ONE TABLET BY MOUTH NIGHTLY   Quantity: 90 tablet  Refills: 0     traMADol 50 MG Tabs  Commonly known as: Ultram      Take 1 tablet (50 mg total) by mouth every 4 to 6 hours as needed for Pain (Can alternate with Oxycodone). Do not take along with oxycodone or norco (hydrocodone) - separate by 2 hours if needed.   Quantity: 40 tablet  Refills: 0            STOP taking these medications      Ketorolac Tromethamine 10 MG Tabs  Commonly known as: TORADOL                  Where to Get Your Medications        Please  your prescriptions at the location directed by your doctor or nurse    Bring a paper prescription for each of these medications  albuterol 108 (90 Base) MCG/ACT Aers  apixaban 5 MG Tabs         [place]    Follow-up appointment:   [facility]  [facility] [address]  [facility] [address] [phone]  Schedule an appointment as soon as possible for a visit in 3 day(s)  For routine follow-up after hospitilization    Efra Torrez MD  [address]  [facility] [id number] [phone]    Call in 1 week(s)  Establish care      Vital signs:       Physical Exam:  See exam from day of discharge note  -----------------------------------------------------------------------------------------------  PATIENT DISCHARGE INSTRUCTIONS: See electronic chart    Jose Ball MD 5/16/2024    Time spent:  45 minutes

## 2024-05-21 ENCOUNTER — PATIENT OUTREACH (OUTPATIENT)
Dept: INTERNAL MEDICINE CLINIC | Facility: CLINIC | Age: 74
End: 2024-05-21

## 2024-05-21 NOTE — PROGRESS NOTES
T[facility] apt request (discharged 05/17)     [facility]  [address]  [place] [address] [phone]  Declined - Called and spoke with pt's daughter who confimred that her mother was already seen by her PCP :  Dr. Edvin Tomlinson  Friday 5/17 @8am    No further assistance needed.      Closing encounter

## 2024-12-25 ENCOUNTER — HOSPITAL ENCOUNTER (EMERGENCY)
Age: 74
Discharge: HOME | End: 2024-12-25
Attending: EMERGENCY MEDICINE
Payer: COMMERCIAL

## 2024-12-25 VITALS
BODY MASS INDEX: 32.44 KG/M2 | SYSTOLIC BLOOD PRESSURE: 146 MMHG | HEIGHT: 64 IN | TEMPERATURE: 98 F | HEART RATE: 62 BPM | DIASTOLIC BLOOD PRESSURE: 74 MMHG | OXYGEN SATURATION: 98 % | WEIGHT: 190 LBS | RESPIRATION RATE: 16 BRPM

## 2024-12-25 DIAGNOSIS — R55 SYNCOPE, NEAR: Primary | ICD-10-CM

## 2024-12-25 DIAGNOSIS — F41.9 ANXIETY: ICD-10-CM

## 2024-12-25 LAB
ALBUMIN SERPL-MCNC: 4.2 G/DL (ref 3.2–4.8)
ALBUMIN/GLOB SERPL: 1.6 {RATIO} (ref 1–2)
ALP LIVER SERPL-CCNC: 71 U/L
ALT SERPL-CCNC: 8 U/L
ANION GAP SERPL CALC-SCNC: 5 MMOL/L (ref 0–18)
AST SERPL-CCNC: 20 U/L (ref ?–34)
BASOPHILS # BLD AUTO: 0.04 X10(3) UL (ref 0–0.2)
BASOPHILS NFR BLD AUTO: 0.7 %
BILIRUB SERPL-MCNC: 0.3 MG/DL (ref 0.2–1.1)
BUN BLD-MCNC: 15 MG/DL (ref 9–23)
CALCIUM BLD-MCNC: 9.8 MG/DL (ref 8.7–10.4)
CHLORIDE SERPL-SCNC: 112 MMOL/L (ref 98–112)
CO2 SERPL-SCNC: 25 MMOL/L (ref 21–32)
CREAT BLD-MCNC: 0.67 MG/DL
EGFRCR SERPLBLD CKD-EPI 2021: 91 ML/MIN/1.73M2 (ref 60–?)
EOSINOPHIL # BLD AUTO: 0.15 X10(3) UL (ref 0–0.7)
EOSINOPHIL NFR BLD AUTO: 2.6 %
ERYTHROCYTE [DISTWIDTH] IN BLOOD BY AUTOMATED COUNT: 14.4 %
GLOBULIN PLAS-MCNC: 2.7 G/DL (ref 2–3.5)
GLUCOSE BLD-MCNC: 96 MG/DL (ref 70–99)
HCT VFR BLD AUTO: 37.6 %
HGB BLD-MCNC: 12.5 G/DL
IMM GRANULOCYTES # BLD AUTO: 0.01 X10(3) UL (ref 0–1)
IMM GRANULOCYTES NFR BLD: 0.2 %
LYMPHOCYTES # BLD AUTO: 2.06 X10(3) UL (ref 1–4)
LYMPHOCYTES NFR BLD AUTO: 35.2 %
MCH RBC QN AUTO: 31 PG (ref 26–34)
MCHC RBC AUTO-ENTMCNC: 33.2 G/DL (ref 31–37)
MCV RBC AUTO: 93.3 FL
MONOCYTES # BLD AUTO: 0.49 X10(3) UL (ref 0.1–1)
MONOCYTES NFR BLD AUTO: 8.4 %
NEUTROPHILS # BLD AUTO: 3.11 X10 (3) UL (ref 1.5–7.7)
NEUTROPHILS # BLD AUTO: 3.11 X10(3) UL (ref 1.5–7.7)
NEUTROPHILS NFR BLD AUTO: 52.9 %
OSMOLALITY SERPL CALC.SUM OF ELEC: 295 MOSM/KG (ref 275–295)
PLATELET # BLD AUTO: 194 10(3)UL (ref 150–450)
POTASSIUM SERPL-SCNC: 4 MMOL/L (ref 3.5–5.1)
PROT SERPL-MCNC: 6.9 G/DL (ref 5.7–8.2)
RBC # BLD AUTO: 4.03 X10(6)UL
SODIUM SERPL-SCNC: 142 MMOL/L (ref 136–145)
TROPONIN I SERPL HS-MCNC: 9 NG/L
WBC # BLD AUTO: 5.9 X10(3) UL (ref 4–11)

## 2024-12-25 PROCEDURE — 93005 ELECTROCARDIOGRAM TRACING: CPT

## 2024-12-25 PROCEDURE — 80053 COMPREHEN METABOLIC PANEL: CPT | Performed by: EMERGENCY MEDICINE

## 2024-12-25 PROCEDURE — 84484 ASSAY OF TROPONIN QUANT: CPT | Performed by: EMERGENCY MEDICINE

## 2024-12-25 PROCEDURE — 36415 COLL VENOUS BLD VENIPUNCTURE: CPT

## 2024-12-25 PROCEDURE — 85025 COMPLETE CBC W/AUTO DIFF WBC: CPT | Performed by: EMERGENCY MEDICINE

## 2024-12-25 PROCEDURE — 93010 ELECTROCARDIOGRAM REPORT: CPT

## 2024-12-25 PROCEDURE — 99284 EMERGENCY DEPT VISIT MOD MDM: CPT

## 2024-12-25 NOTE — ED INITIAL ASSESSMENT (HPI)
Family reports pt had a panic attack around 2 AM that was triggered by a family argument. Hx of anxiety. Pt calm at this time. No headache or dizziness.  No chest pain.

## 2024-12-26 LAB
ATRIAL RATE: 68 BPM
P AXIS: 71 DEGREES
P-R INTERVAL: 152 MS
Q-T INTERVAL: 404 MS
QRS DURATION: 86 MS
QTC CALCULATION (BEZET): 429 MS
R AXIS: 17 DEGREES
T AXIS: 31 DEGREES
VENTRICULAR RATE: 68 BPM

## 2024-12-26 NOTE — ED PROVIDER NOTES
Patient Seen in: [facility] [place]      History     Chief Complaint   Patient presents with    Anxiety/Panic attack     Stated Complaint: anxiety attack    Subjective:   HPI      Patient is a 74-year-old female presenting to the ED with a near syncopal event related to anxiety and a panic attack at home.  As her [place] is ending, a family argument ensued at approximately 1 AM.  The patient became visibly upset per family.  Patient also admits to being very upset about an argument.  She states that her mouth was very dry.  She was feeling lightheaded.  The family was talking to her, it looked like she was going to close her eyes and faint.  However, the patient did not have any true syncope or loss of consciousness.  Her anxiety and panic attack along with near syncopal episode lasted approximately 10 minutes.  Patient is completely asymptomatic at this time.  She has chronic left hip pain and is scheduled for left hip replacement surgery.  No headache.  No chest pain or shortness of breath associated with recent symptoms.  No new leg edema.  She does have a history of PE in the past, treated with Eliquis but no longer taking anticoagulation.  History of hypertension, dyslipidemia.  No history of diabetes.  Patient has had similar episodes in the past with anxiety or panic attack.  No weakness or loss of sensation.  No slurring of speech.  No facial droop.  Patient ambulates with assistance of a walker.  No lightheadedness or dizziness with changes in position.  No acute visual disturbance.    Objective:     Past Medical History:    Essential hypertension    High blood pressure    High cholesterol    Osteoarthritis    Other acute pulmonary embolism without acute cor pulmonale (HCC)              Past Surgical History:   Procedure Laterality Date    Colonoscopy      Other surgical history      Oral tooth extraction surgery     Total abdom hysterectomy                  Social History      Socioeconomic History    Marital status:    Tobacco Use    Smoking status: Never     Passive exposure: Never    Smokeless tobacco: Never   Substance and Sexual Activity    Alcohol use: No     Alcohol/week: 0.0 standard drinks of alcohol    Drug use: No     Social Drivers of Health     Food Insecurity: No Food Insecurity (5/12/2024)    Food Insecurity     Food Insecurity: Never true   Transportation Needs: No Transportation Needs (5/12/2024)    Transportation Needs     Lack of Transportation: No   Housing Stability: Low Risk  (5/12/2024)    Housing Stability     Housing Instability: No                  Physical Exam     ED Triage Vitals [12/25/24 0445]   /68   Pulse 71   Resp 13   Temp 97.9 °F (36.6 °C)   Temp src Oral   SpO2 98 %   O2 Device None (Room air)       Current Vitals:   Vital Signs  BP: 146/74  Pulse: 62  Resp: 16  Temp: 97.9 °F (36.6 °C)  Temp src: Oral    Oxygen Therapy  SpO2: 98 %  O2 Device: None (Room air)        Physical Exam  Vitals and nursing note reviewed.   Constitutional:       General: She is not in acute distress.     Appearance: Normal appearance. She is well-developed. She is not ill-appearing or toxic-appearing.   HENT:      Head: Normocephalic and atraumatic.      Right Ear: External ear normal.      Left Ear: External ear normal.      Mouth/Throat:      Mouth: Mucous membranes are moist.      Pharynx: Oropharynx is clear.   Eyes:      Conjunctiva/sclera: Conjunctivae normal.   Cardiovascular:      Rate and Rhythm: Normal rate and regular rhythm.      Heart sounds: Normal heart sounds.   Pulmonary:      Effort: Pulmonary effort is normal.      Breath sounds: Normal breath sounds.   Abdominal:      General: Abdomen is flat. Bowel sounds are normal. There is no distension.      Tenderness: There is no abdominal tenderness.   Musculoskeletal:      Right lower leg: No edema.      Left lower leg: No edema.   Skin:     General: Skin is warm.      Capillary Refill: Capillary  refill takes less than 2 seconds.      Findings: No rash.   Neurological:      General: No focal deficit present.      Mental Status: She is alert and oriented to person, place, and time.      Sensory: No sensory deficit.      Motor: No weakness.      Comments: No weakness but has left hip secondary to pain, chronic.   Psychiatric:         Mood and Affect: Mood normal.         Behavior: Behavior normal.             ED Course     Labs Reviewed   COMP METABOLIC PANEL (14) - Abnormal; Notable for the following components:       Result Value    ALT 8 (*)     All other components within normal limits   TROPONIN I HIGH SENSITIVITY - Normal   CBC WITH DIFFERENTIAL WITH PLATELET   RAINBOW DRAW LAVENDER   RAINBOW DRAW LIGHT GREEN   RAINBOW DRAW BLUE     EKG    Rate, intervals and axes as noted on EKG Report.  Rate: 68  Rhythm: Sinus Rhythm  Reading: Normal                       MDM      History obtained from patient's family.  Offered  to the patient and family however they declined the need for an  as family was there to provide history as they witnessed the event.     Differential diagnosis includes anxiety or panic attack with hyperventilation leading to dry mouth and near syncope, consider cardiac dysrhythmia although patient has not had any palpitations or shortness of breath, no associated chest pain, electrolyte disturbance, patient has never had any focal neurodeficits such as weakness or loss of sensation, no facial droop.  She states she was more lightheaded with a dry mouth preventing her from answering questions that she was going to faint not that she experienced any expressive aphasia or slurred speech.  Not likely orthostatic hypotension as patient does not become lightheaded or dizzy with changes in position such as standing.    Previous records reviewed.  Patient is scheduled for left hip replacement February 3, 2025.  History of PE May 2024 which was provoked postoperatively after  having hysterectomy.  Patient is currently not on any anticoagulation without any new complaints of unilateral lower extremity edema, no chest pain or shortness of breath.  Patient is not hypoxic nor she tachycardic, normal EKG.    Testing considered and ordered includes EKG, CBC, CMP, troponin.    I reviewed all results.  CBC, CMP, troponin are unremarkable.  Patient has not required any further interventions in ED.  We attempted to stand her at the bedside.  No lightheadedness or dizziness.  The patient remains completely asymptomatic during the entire ED stay.  Family states she was initially taken to [facility] however they left the waiting room coming to [place] ED for further evaluation due to wait times.  They are comfortable with patient going home at this time suspected that she became upset after a family argument resulting.  Patient panic attack she has had similar episodes in the past.  She has no lightheadedness or dizziness at this time.  Patient would like to go home.  Family was comfortable with discharge.  Recommend close outpatient follow-up with primary care provider for reevaluation.              Medical Decision Making      Disposition and Plan     Clinical Impression:  1. Syncope, near    2. Anxiety         Disposition:  Discharge  12/25/2024  7:09 am    Follow-up:  Edvin Tomlinson MD  [address] [place] [id number] [phone]    Schedule an appointment as soon as possible for a visit in 2 day(s)      [facility] in [place]  [id number] [place] [id number]  [phone]  Follow up  IF SYMPTOMS WORSEN, PERSIST, OR NEW SYMPTOMS DEVEL          Medications Prescribed:  Discharge Medication List as of 12/25/2024  7:18 AM              Supplementary Documentation:

## 2025-01-08 ENCOUNTER — LABORATORY ENCOUNTER (OUTPATIENT)
Dept: LAB | Age: 75
End: 2025-01-08
Attending: ORTHOPAEDIC SURGERY
Payer: COMMERCIAL

## 2025-01-08 DIAGNOSIS — Z01.818 PRE-OP TESTING: ICD-10-CM

## 2025-01-08 LAB
ANTIBODY SCREEN: NEGATIVE
RH BLOOD TYPE: POSITIVE

## 2025-01-08 PROCEDURE — 86900 BLOOD TYPING SEROLOGIC ABO: CPT

## 2025-01-08 PROCEDURE — 86850 RBC ANTIBODY SCREEN: CPT

## 2025-01-08 PROCEDURE — 86901 BLOOD TYPING SEROLOGIC RH(D): CPT

## 2025-01-08 PROCEDURE — 87081 CULTURE SCREEN ONLY: CPT

## 2025-01-28 NOTE — H&P (VIEW-ONLY)
Patient ID: Dolores Finn     Chief Complaint:    Left hip pain    HPI:  Dolores Finn is a 74 year old female who's following up on their left hip pain. Pain is described as sharp, is worse with weight bearing. Pain is located in the anterior portion and in a C around the hip. Factors that aggravate symptoms include sleeping and weight bearing. Patient denies any numbness, tingles, or radicular symptoms. Patient did a course of intra-articular injection, which provided one month of relief. Patient ambulates with a cane. Factors that aggravate symptoms include: getting in and out of car, putting shoes and socks on activity, getting up from a chair.  Weight Bearing: with a cane.  Patient denies any numbness, tingles, or radicular symptoms. Patient denies any signs of infection including fever or chills.  Patient states that the symptoms are getting progressively worse.  The pain is affecting their quality of life, ability to sleep at night, ability to perform activities and daily living and to ambulate.  Pain with walking and stair climbing.  They report they do walk with a limp.   They do have some low back pain.  Patient does note stiffness in the hip. Pain has continued and here for surgical discussion.         Physical Exam:     Vital Signs:  LMP  (LMP Unknown)     Past Medical History:   Diagnosis Date   • Essential hypertension    • High blood pressure    • High cholesterol    • Osteoarthritis      Past Surgical History:   Procedure Laterality Date   • COLONOSCOPY STOMA DX INCLUDING COLLJ SPEC SPX     • OTHER SURGICAL HISTORY      Oral tooth extraction surgery      Current Outpatient Medications   Medication Sig Dispense Refill   • lisinopril-hydroCHLOROthiazide 20-12.5 MG Oral Tab Take 1 tablet by mouth daily. 100 tablet 1   • Lovastatin 10 MG Oral Tab Take 1 tablet (10 mg total) by mouth nightly. 90 tablet 1   • ergocalciferol 1.25 MG (67954 UT) Oral Cap Take 1 capsule (50,000 Units total) by mouth once a  week. 12 capsule 1   • CELECOXIB 200 MG Oral Cap TAKE ONE CAPSULE BY MOUTH EVERY DAY 30 capsule 1   • traMADol 50 MG Oral Tab Take 1 tablet (50 mg total) by mouth every 12 (twelve) hours as needed for Pain. 60 tablet 0     No Known Allergies  Social History    Tobacco Use      Smoking status: Never      Smokeless tobacco: Never    Alcohol use: No      Alcohol/week: 0.0 standard drinks of alcohol    Family History   Problem Relation Age of Onset   • Cancer Father         stomach    • Cancer Sister         ovarian   • Cancer Brother         lung   • Cancer Sister         ovarian       ROS:     All other pertinent positives and negatives as noted above in HPI.    Physical Exam  Vital Signs:  LMP  (LMP Unknown)   Estimated body mass index is 35.12 kg/m² as calculated from the following:    Height as of 1/16/25: 5' 2\" (1.575 m).    Weight as of 1/16/25: 192 lb (87.1 kg).    antalgic gait  with assistive device    Right hip: Limited internal (15 degrees) and external rotation (25 degrees) with no pain in the groin region, Limited abduction (0-15/20) degrees, adduction (15 degrees) and flexion ( -5-95 degrees)         5/5 strength  5/5 knee flexion and extension  5/5 ankle dorsiflexion and plantarflexion  Sensation grossly intact in thigh, leg and foot  Negative Trendelenburg sign  Negative Stinchfield sign  2+ pedal pulses and brisk capillary refill  No skin rashes or lesion noted    Left hip: Limited internal (0 degrees) and external rotation (20 degrees) with pain in the groin region, Limited abduction (0-15/20) degrees, adduction (15 degrees) and flexion ( -5-95 degrees)      5/5 strength  5/5 knee flexion and extension  5/5 ankle dorsiflexion and plantarflexion  Sensation grossly intact in thigh, leg and foot  Negative Trendelenburg sign  Negative Stinchfield sign  2+ pedal pulses and brisk capillary refill  No skin rashes or lesions noted    Back:  Deformity: no  Pelvic obliquity: no  Tenderness:  no        Radiographs:    Imaging was personally and individually reviewed and discussed at length with the patient:      Two views of the left hip(s) were reviewed in the office today, including AP pelvis and lateral views.  There is severe joint space narrowing (bone-on-bone) with large osteophyte formation off the acetabulum and the head neck junction.  There is sclerosis noted in the femoral head and acetabulum. There is no fracture or dislocation.  No periosteal reactions or medullary lesions are seen.      Hb.1  Alb: 4.4  Cr:.54  GFR: 95.6  MSSA -  MRSA -        Assessment:     left hip osteoarthritis  Hx of PE            Plan:       Continuation of conservative management vs. NORA discussed.  The patient wishes to proceed with left total hip replacement.      One or more of the conservative treatments have been tried and failed for three months or more except in special circumstances where delay of definitive care is not appropriate: non steroid anti-infammatory medication(s), physical therapy, and corticosteroid injection(s)      Risk and benefits of surgery were reviewed.  Including, but not limited to, blood clots, anesthesia risk, infection, leg length discrepancy, failure of the implant, need for future surgeries, continued pain, hematoma, need for transfusion, and death, among others.  The patient understands and wishes to proceed.     The spectrum of treatment options were discussed with the patient in detail including both the nonoperative and operative treatment modalities and their respective risks and benefits.  After thorough discussion, the patient has elected to undergo surgical treatment.  The details of the surgical procedure were explained including the location of probable incisions and a description of the likely implants to be used.  Models and diagrams were used as educational resources. The patient understands the likely convalescence after surgery, as well as the rehabilitation  required.  We thoroughly discussed the risks, benefits, and alternatives to surgery.  The risks include but are not limited to the risk of infection, joint stiffness, blood clots (including DVT and/or pulmonary embolus along with the risk of death), neurologic and/or vascular injury, fracture, dislocation, nonunion, malunion, need for further surgery including hardware failure requiring revision, and continued pain.  It was explained that if tissue has been repaired or reconstructed, there is also a chance of failure which may require further management.  In addition, we have discussed the risks, including the risk of disease transmission, associated with any allograft tissue which may be utilized.  Following the completion of the discussion, the patient expressed understanding of this planned course of care, all their questions were answered and consent will be obtained preoperatively.    The risks, benefits and alternatives of surgery were discussed with the patient including, but not limited to:   You may be allergic to the medicine that you get during surgery.   The nerves or tendons around your hip may be hurt during surgery.   You may bleed more than expected, requiring blood transfusion.  You may get an infection which will require additional surgery and possibly removal of all implants to cure. Patients with diabetes or who are on certain medications to suppress the immune system are particularly at risk for infection. Patients over 40 BMI are at significant risk for infection and wound healing problems.  You could have a fracture during surgery, or within several weeks after surgery. Patients with osteopenia or osteoporosis are at increased risk for fracture during and after surgery.  You may have problems with your heart and/or kidneys. Patients with history of heart disease are at increased risk for cardiac complications and some of the medications used during and after surgery may affect kidney  function.  After surgery, your hip may be stiff and painful. Your hip and knee may swell. This usually lasts about 6 weeks and may be permanent.  Your legs may not be the same length.   Your implant may get loose or move out of place causing pain.  The implant may get worn out over time and need to be replaced.   After having surgery, you may lose your balance and be more likely to fall for a time.   You can dislocate your hip which would require an additional procedure and potentially surgery to correct.  You may get a blood clot in your leg or arm. This can cause pain and swelling, and it can stop blood from flowing where it needs to go in your body. The blood clot can break loose and travel to your lungs or brain. A blood clot in your lungs can cause chest pain, trouble breathing and possibly death. A blood clot in your brain can cause a stroke. These problems can be life-threatening.       Pain medication discussed.    L NORA at [facility] obs given hx of PE     no smoking  no diabetes - Last A1c value was 5.3% done 2/28/2023.   BMI - 35  no MAKEDA  no hx of infections/MRSA/dental/joint  yes hx of blood clots  - PE after surgery 5/2024  No longer on blood thinner  last injection > 3 mo ago  no lumbar surgery  no allergy  No Cardiac history  Yes Pulmonary history hx of PE        - risks, benefits and alternatives discussed  - labs reviewed and meds given   - proceed with left total hip arthroplasty       Diagnoses and all orders for this visit:    Primary osteoarthritis of left hip

## 2025-01-31 NOTE — DISCHARGE INSTRUCTIONS
Sometimes managing your health at home requires assistance.  The [facility] team has recognized your preference to use [facility].  They can be reached by phone at [phone].  The fax number for your reference is [phone].  A representative from the home health agency will contact you or your family to schedule your first visit.     Hip Replacement Discharge Instructions    Dear Patient,     [facility] cares about your progress with recovery following your joint replacement surgery.     300 days from your scheduled surgery, [facility] will send you a follow-up survey to help us understand how your surgery impacted your mobility, pain, and overall quality of life. Please make every effort to complete this survey. The information collected from this survey will be used by your physician to track your recovery.     Sincerely,     [facility] [place]    Activity    Bathing  No tub baths, pools, or saunas until cleared by surgeon (about 4-6 weeks because it takes that long for the incision on the skin to heal and be a barrier to prevent infection.)  When allowed to shower:      AQUACEL dressing is waterproof and does not require being covered before showering.  Pat dressing and surrounding skin dry after shower                      AQUACEL        MEDIPORE/COVERLET dressing is NOT waterproof and REQUIRES being covered with a waterproof barrier to keep the dressing and incision dry.  SARAN WRAP, GLAD WRAP, PRESS N SEAL WORK REALLY WELL BUT ANY PLASTIC WRAP WILL DO.  Do not wash incision.   Remove entire wrapping and old dressing (if Medipore/coverlet) after showering. Pat dry with a CLEAN TOWEL if necessary and cover incision with new Medipore/coverlet. For other types of dressings, follow surgeon’s orders.                  MEDIPORE/COVERLET            Driving  Do not drive until cleared by surgeon. This is usually four to six weeks after surgery.  Discuss this at follow-up office visit.   Not allowed while taking narcotic pain medication or muscle relaxants.    Sex  Usually allowed after four to six weeks - check with surgeon at your office visit.  Return to work  Usually allowed after four to six weeks. Discuss specific work activities with your surgeon.    Restrictions  For hip replacement surgery, follow instructions provided by physical therapy    No smoking  Avoid smoking. It is known to cause breathing problems and can decrease the rate of healing.    Incision care/Dressing changes  Wash hands before and after dressing changes.    FOR MEDIPORE/COVERLET DRESSINGS:  Change dressing daily using Medipore/coverlet once Aquacel (waterproof) dressing is removed (which is about 7 days after surgery). Patient should be standing or lying flat so dressing goes on smoothly.  (This dressing needed for hip patients because of location of incision-don’t want contamination from bathroom use)   Continue this until your first visit with your surgeon’s office.  There could be a small amount of redness around the staples or incision; this is normal.  Watch for increased redness, warmth, any odor, increased drainage or opening of the incision. A little clear yellow or blood tinged drainage is normal up to 2 weeks after surgery but it should be less every day until it stops.  Call physician if you notice any concerning changes.  Sutures/staples will be removed at first office visit (10 days- 3 weeks).                         MEDIPORE /COVERLET          Medication  Anticoagulants = blood thinners (Xarelto, Eliquis, Lovenox, Coumadin or Aspirin)  Pill or shot form depending on what your physician orders.   IF placed on Coumadin, you may also need lab work done for monitoring.  You will bleed easier and bruise easier while on these medications.     Usually you will be on a blood thinner for about 4-5 weeks.  Contact your physician if you have signs of bruising, nose bleeds or  blood in your urine. Use electric razors and soft toothbrushes only.  Do not take aspirin while taking blood thinners unless ordered by your physician.  Review anticoagulant education information sheet provided.    Discomfort  Surgical discomfort is normal for one to two months.  Have realistic goals and keep a positive outlook.  Keep pain manageable; pain should not disrupt your sleep or activities like getting out of bed or walking.  You may need pain medication regularly (every 4-6 hours) the first 2 weeks and then begin to decrease how often you are taking it.  Take pain medication as prescribed with food, especially before therapy, allowing 30-60 minutes to take effect.  Do not drink alcohol while on pain medication.  As you have less discomfort, decrease the amount of pain medication you take. Use plain Tylenol (acetaminophen) for less severe pain.  Some pain medications have Tylenol (acetaminophen) in them such as Norco and Percocet. Do NOT take Tylenol (acetaminophen) within 4 hours of a dose of these medications.  Apply ice  or cold therapy to surgical site for 20 minutes at least four times a day, especially after therapy. Be sure there is a thin cloth barrier between skin and ice or cold therapy.  Change position at least every 45 minutes while awake to avoid stiffness or increased discomfort.  Deep breathing and relaxation techniques and distractions can help!  If you focus on something else, you do not experience the pain the same. Take advantage of everything available to your to help control you discomfort.  Contact physician if discomfort does not respond to pain medication.    Body changes  Constipation is common with the use of narcotics.  Eat fiber rich foods and drink plenty of fluids.  Use stool softeners such as Colace or Senakot while on narcotics, and laxatives such as Miralax or Milk of Magnesia if needed.   An enema or suppository may be needed if above measures do not work.    Prevention  of infection and promotion of healing  Good hand washing is important. Everyone should wash their hands or use hand  as soon as they walk in your house-whether they live there or are visiting.  Keep bed linen/clothing freshly laundered.  Do not allow others or pets to touch your incision.  Avoid people that have colds or the flu.  Your surgeon may recommend that you take antibiotics before you undergo any dental or other invasive surgical procedures after your joint replacement. Speak with your physician about this at your post-op office visit.  Eat a balanced diet high in fiber and drink plenty of fluids.   Continue using incentive spirometry because narcotics make you sleepy so you may not take good deep breaths. We do not want you to get pneumonia.     Post op Office visits  Schedule 10 days to 3 weeks after surgery WITH SURGEON’s office.  Additional visits may need to be scheduled. Your physician will discuss this at first post-op office visit.  Schedule outpatient physical therapy per your surgeon’s orders.  Schedule one week follow up after surgery WITH PRIMARY CARE PHYSICIAN; review your medications over last 6 months.  Your body gets stressed by surgery and that stress can affect all your other health issues (such as high blood pressure, diabetes, CHF, afib, and asthma just to name a few).  We don’t want those other health issues to cause you to get readmitted to the hospital; much better for you to catch developing problems and prevent them from becoming larger ones.  MESFIN HOSE - IF ordered by your surgeon, wear these during the day and off at night.  Surgeon will tell you when you don’t need them anymore.    Notify your surgeon if you notice any of the following signs  Separation of incision line.  Increased redness, swelling, or warmth of skin around incision.  Increased or foul smelling drainage from incision  Red streaks on skin near incision.  Temperature >100.4F.  Increased pain at incision  not relieved by pain medication.    Signs of Possible Dislocation  Increased severe leg or groin pain  Turning in or out of surgical leg that is new  Increased numbness, tingling to leg  Inability to walk or put weight on your surgical leg        Signs of blood clot  Pain, excessive tenderness, redness, or swelling in leg or calf (other than incision site).    Go directly to the ER or CALL 911 if  you:  become short of breath  have chest pain  cough up blood  have unexplained anxiety with breathing   Traveling and Handicapped parking  Check with you surgeon when you are allowed to travel so you don’t set yourself up for greater chance of complications.  If traveling by car, get out to stretch every 2 hours.  This helps prevent stiffness.  You may need to do this any time you travel for the first year after surgery.  If traveling by plane, BEFORE you get into a security line, let them know that you had your hip replaced, as you will most likely set off the metal detector. The doctors no longer provide an identification card for this as they are easily copied. ALSO request a wheelchair the first year to board and get off a plane…this aids in priority seating and you should sit on the aisle or at the bulkhead where you can easily stretch your legs and get up to walk up and down the aisles…this helps prevent blood clots and stiffness.  TEMPORARY HANDICAP PARKING APPLICATION  (good for 3-6 months)  - At Surgeon or PCP visit, request they fill out the form, then go to [employer] (only time you do not wait in a long line there). Some [facility] offices provide the same service. ([facility] and [place] have this service; if you live in another [facility], you may check with them as well). You need space to open car doors to position yourself properly with walker to get in and out of your car safely; some parking spaces are  practically on top of each other and do not give you enough room.     SPECIAL   INSTRUCTIONS:  Spanda- hip replacement(single layer compression sleeve):     All patients should wear the compression sleeve until first follow up visit to surgeon’s office (about 2-3 weeks) and then check with surgeon if need to continue use.  Take off to shower. Best to keep on as much as possible, even at night, except when washing out.  Wash with mild soap and water; DRIP dry overnight- put back on before getting up for the day.  Use one long tube on the calf.   It should end up just below the back of the knee and the other end on the ball of the foot to expose the toes.   Makes sure it is NOT bunched up anywhere.  IF the Spanda- feels TOO tight, then REMOVE it and call your surgeon to let them know.

## 2025-02-03 ENCOUNTER — APPOINTMENT (OUTPATIENT)
Dept: GENERAL RADIOLOGY | Facility: HOSPITAL | Age: 75
End: 2025-02-03
Attending: ORTHOPAEDIC SURGERY
Payer: MEDICARE

## 2025-02-03 ENCOUNTER — ANESTHESIA EVENT (OUTPATIENT)
Dept: SURGERY | Facility: HOSPITAL | Age: 75
End: 2025-02-03
Payer: MEDICARE

## 2025-02-03 ENCOUNTER — HOSPITAL ENCOUNTER (OUTPATIENT)
Facility: HOSPITAL | Age: 75
Discharge: HOME HEALTH | End: 2025-02-04
Attending: ORTHOPAEDIC SURGERY | Admitting: ORTHOPAEDIC SURGERY
Payer: MEDICARE

## 2025-02-03 ENCOUNTER — ANESTHESIA (OUTPATIENT)
Dept: SURGERY | Facility: HOSPITAL | Age: 75
End: 2025-02-03
Payer: MEDICARE

## 2025-02-03 DIAGNOSIS — Z01.818 PRE-OP TESTING: Primary | ICD-10-CM

## 2025-02-03 PROCEDURE — 88311 DECALCIFY TISSUE: CPT | Performed by: ORTHOPAEDIC SURGERY

## 2025-02-03 PROCEDURE — 88304 TISSUE EXAM BY PATHOLOGIST: CPT | Performed by: ORTHOPAEDIC SURGERY

## 2025-02-03 PROCEDURE — 76000 FLUOROSCOPY <1 HR PHYS/QHP: CPT | Performed by: ORTHOPAEDIC SURGERY

## 2025-02-03 PROCEDURE — 0SRB049 REPLACEMENT OF LEFT HIP JOINT WITH CERAMIC ON POLYETHYLENE SYNTHETIC SUBSTITUTE, CEMENTED, OPEN APPROACH: ICD-10-PCS | Performed by: ORTHOPAEDIC SURGERY

## 2025-02-03 PROCEDURE — 73501 X-RAY EXAM HIP UNI 1 VIEW: CPT | Performed by: ORTHOPAEDIC SURGERY

## 2025-02-03 DEVICE — IMPLANTABLE DEVICE
Type: IMPLANTABLE DEVICE | Site: HIP | Status: FUNCTIONAL
Brand: REFOBACIN® BONE CEMENT R

## 2025-02-03 DEVICE — BONE PREPARATION KIT
Type: IMPLANTABLE DEVICE | Site: HIP | Status: FUNCTIONAL
Brand: BIOPREP

## 2025-02-03 DEVICE — IMPLANTABLE DEVICE
Type: IMPLANTABLE DEVICE | Site: HIP | Status: FUNCTIONAL
Brand: G7® VIVACIT-E®

## 2025-02-03 DEVICE — IMPLANTABLE DEVICE: Type: IMPLANTABLE DEVICE | Site: HIP | Status: FUNCTIONAL

## 2025-02-03 DEVICE — IMPLANTABLE DEVICE
Type: IMPLANTABLE DEVICE | Site: HIP | Status: FUNCTIONAL
Brand: AVENIR® MÜLLER

## 2025-02-03 DEVICE — IMPLANTABLE DEVICE
Type: IMPLANTABLE DEVICE | Site: HIP | Status: FUNCTIONAL
Brand: G7® ACETABULAR SYSTEM

## 2025-02-03 DEVICE — BIOLOX® DELTA, CERAMIC FEMORAL HEAD, S, Ø 36/-3.5, TAPER 12/14
Type: IMPLANTABLE DEVICE | Site: HIP | Status: FUNCTIONAL
Brand: BIOLOX® DELTA

## 2025-02-03 RX ORDER — HYDROMORPHONE HYDROCHLORIDE 1 MG/ML
0.2 INJECTION, SOLUTION INTRAMUSCULAR; INTRAVENOUS; SUBCUTANEOUS EVERY 2 HOUR PRN
Status: DISCONTINUED | OUTPATIENT
Start: 2025-02-03 | End: 2025-02-04

## 2025-02-03 RX ORDER — DIPHENHYDRAMINE HYDROCHLORIDE 50 MG/ML
25 INJECTION INTRAMUSCULAR; INTRAVENOUS ONCE AS NEEDED
Status: ACTIVE | OUTPATIENT
Start: 2025-02-03 | End: 2025-02-03

## 2025-02-03 RX ORDER — TRANEXAMIC ACID 10 MG/ML
1000 INJECTION, SOLUTION INTRAVENOUS ONCE
Status: COMPLETED | OUTPATIENT
Start: 2025-02-03 | End: 2025-02-03

## 2025-02-03 RX ORDER — NALOXONE HYDROCHLORIDE 0.4 MG/ML
0.08 INJECTION, SOLUTION INTRAMUSCULAR; INTRAVENOUS; SUBCUTANEOUS AS NEEDED
Status: DISCONTINUED | OUTPATIENT
Start: 2025-02-03 | End: 2025-02-03 | Stop reason: HOSPADM

## 2025-02-03 RX ORDER — HYDROMORPHONE HYDROCHLORIDE 1 MG/ML
0.4 INJECTION, SOLUTION INTRAMUSCULAR; INTRAVENOUS; SUBCUTANEOUS EVERY 2 HOUR PRN
Status: DISCONTINUED | OUTPATIENT
Start: 2025-02-03 | End: 2025-02-04

## 2025-02-03 RX ORDER — SODIUM CHLORIDE, SODIUM LACTATE, POTASSIUM CHLORIDE, CALCIUM CHLORIDE 600; 310; 30; 20 MG/100ML; MG/100ML; MG/100ML; MG/100ML
INJECTION, SOLUTION INTRAVENOUS CONTINUOUS
Status: DISCONTINUED | OUTPATIENT
Start: 2025-02-03 | End: 2025-02-03 | Stop reason: HOSPADM

## 2025-02-03 RX ORDER — DIPHENHYDRAMINE HYDROCHLORIDE 50 MG/ML
12.5 INJECTION INTRAMUSCULAR; INTRAVENOUS AS NEEDED
Status: DISCONTINUED | OUTPATIENT
Start: 2025-02-03 | End: 2025-02-03 | Stop reason: HOSPADM

## 2025-02-03 RX ORDER — ONDANSETRON 2 MG/ML
INJECTION INTRAMUSCULAR; INTRAVENOUS AS NEEDED
Status: DISCONTINUED | OUTPATIENT
Start: 2025-02-03 | End: 2025-02-03 | Stop reason: SURG

## 2025-02-03 RX ORDER — MIDAZOLAM HYDROCHLORIDE 1 MG/ML
INJECTION INTRAMUSCULAR; INTRAVENOUS AS NEEDED
Status: DISCONTINUED | OUTPATIENT
Start: 2025-02-03 | End: 2025-02-03 | Stop reason: SURG

## 2025-02-03 RX ORDER — DOCUSATE SODIUM 100 MG/1
100 CAPSULE, LIQUID FILLED ORAL 2 TIMES DAILY
Status: DISCONTINUED | OUTPATIENT
Start: 2025-02-03 | End: 2025-02-04

## 2025-02-03 RX ORDER — BISACODYL 10 MG
10 SUPPOSITORY, RECTAL RECTAL
Status: DISCONTINUED | OUTPATIENT
Start: 2025-02-03 | End: 2025-02-04

## 2025-02-03 RX ORDER — DIPHENHYDRAMINE HYDROCHLORIDE 50 MG/ML
12.5 INJECTION INTRAMUSCULAR; INTRAVENOUS EVERY 4 HOURS PRN
Status: DISCONTINUED | OUTPATIENT
Start: 2025-02-03 | End: 2025-02-04

## 2025-02-03 RX ORDER — LISINOPRIL AND HYDROCHLOROTHIAZIDE 12.5; 2 MG/1; MG/1
1 TABLET ORAL
Status: DISCONTINUED | OUTPATIENT
Start: 2025-02-04 | End: 2025-02-03 | Stop reason: SDUPTHER

## 2025-02-03 RX ORDER — SODIUM PHOSPHATE, DIBASIC AND SODIUM PHOSPHATE, MONOBASIC 7; 19 G/230ML; G/230ML
1 ENEMA RECTAL ONCE AS NEEDED
Status: DISCONTINUED | OUTPATIENT
Start: 2025-02-03 | End: 2025-02-04

## 2025-02-03 RX ORDER — ONDANSETRON 2 MG/ML
4 INJECTION INTRAMUSCULAR; INTRAVENOUS EVERY 6 HOURS PRN
Status: DISCONTINUED | OUTPATIENT
Start: 2025-02-03 | End: 2025-02-03 | Stop reason: HOSPADM

## 2025-02-03 RX ORDER — DEXAMETHASONE SODIUM PHOSPHATE 10 MG/ML
8 INJECTION, SOLUTION INTRAMUSCULAR; INTRAVENOUS ONCE
Status: COMPLETED | OUTPATIENT
Start: 2025-02-04 | End: 2025-02-04

## 2025-02-03 RX ORDER — ACETAMINOPHEN 325 MG/1
650 TABLET ORAL
Status: DISCONTINUED | OUTPATIENT
Start: 2025-02-03 | End: 2025-02-04

## 2025-02-03 RX ORDER — ROPIVACAINE HYDROCHLORIDE 5 MG/ML
INJECTION, SOLUTION EPIDURAL; INFILTRATION; PERINEURAL AS NEEDED
Status: DISCONTINUED | OUTPATIENT
Start: 2025-02-03 | End: 2025-02-03 | Stop reason: SURG

## 2025-02-03 RX ORDER — ASPIRIN 81 MG/1
81 TABLET ORAL 2 TIMES DAILY
Status: DISCONTINUED | OUTPATIENT
Start: 2025-02-03 | End: 2025-02-04

## 2025-02-03 RX ORDER — SENNOSIDES 8.6 MG
17.2 TABLET ORAL NIGHTLY
Status: DISCONTINUED | OUTPATIENT
Start: 2025-02-03 | End: 2025-02-04

## 2025-02-03 RX ORDER — HYDROMORPHONE HYDROCHLORIDE 1 MG/ML
0.2 INJECTION, SOLUTION INTRAMUSCULAR; INTRAVENOUS; SUBCUTANEOUS EVERY 5 MIN PRN
Status: DISCONTINUED | OUTPATIENT
Start: 2025-02-03 | End: 2025-02-03 | Stop reason: HOSPADM

## 2025-02-03 RX ORDER — TRAMADOL HYDROCHLORIDE 50 MG/1
50 TABLET ORAL
COMMUNITY

## 2025-02-03 RX ORDER — AMOXICILLIN 250 MG
1 CAPSULE ORAL DAILY
COMMUNITY

## 2025-02-03 RX ORDER — METOCLOPRAMIDE HYDROCHLORIDE 5 MG/ML
10 INJECTION INTRAMUSCULAR; INTRAVENOUS EVERY 8 HOURS PRN
Status: DISCONTINUED | OUTPATIENT
Start: 2025-02-03 | End: 2025-02-04

## 2025-02-03 RX ORDER — ACETAMINOPHEN 500 MG
1000 TABLET ORAL ONCE AS NEEDED
Status: DISCONTINUED | OUTPATIENT
Start: 2025-02-03 | End: 2025-02-03 | Stop reason: HOSPADM

## 2025-02-03 RX ORDER — DEXAMETHASONE SODIUM PHOSPHATE 4 MG/ML
VIAL (ML) INJECTION AS NEEDED
Status: DISCONTINUED | OUTPATIENT
Start: 2025-02-03 | End: 2025-02-03 | Stop reason: SURG

## 2025-02-03 RX ORDER — HYDROCODONE BITARTRATE AND ACETAMINOPHEN 5; 325 MG/1; MG/1
2 TABLET ORAL ONCE AS NEEDED
Status: DISCONTINUED | OUTPATIENT
Start: 2025-02-03 | End: 2025-02-03 | Stop reason: HOSPADM

## 2025-02-03 RX ORDER — HYDROMORPHONE HYDROCHLORIDE 1 MG/ML
0.4 INJECTION, SOLUTION INTRAMUSCULAR; INTRAVENOUS; SUBCUTANEOUS EVERY 5 MIN PRN
Status: DISCONTINUED | OUTPATIENT
Start: 2025-02-03 | End: 2025-02-03 | Stop reason: HOSPADM

## 2025-02-03 RX ORDER — TRANEXAMIC ACID 10 MG/ML
INJECTION, SOLUTION INTRAVENOUS
Status: COMPLETED
Start: 2025-02-03 | End: 2025-02-03

## 2025-02-03 RX ORDER — ONDANSETRON 2 MG/ML
4 INJECTION INTRAMUSCULAR; INTRAVENOUS EVERY 6 HOURS PRN
Status: DISCONTINUED | OUTPATIENT
Start: 2025-02-03 | End: 2025-02-04

## 2025-02-03 RX ORDER — HYDROCODONE BITARTRATE AND ACETAMINOPHEN 5; 325 MG/1; MG/1
1 TABLET ORAL ONCE AS NEEDED
Status: DISCONTINUED | OUTPATIENT
Start: 2025-02-03 | End: 2025-02-03 | Stop reason: HOSPADM

## 2025-02-03 RX ORDER — OXYCODONE HYDROCHLORIDE 5 MG/1
5 TABLET ORAL EVERY 4 HOURS PRN
Status: DISCONTINUED | OUTPATIENT
Start: 2025-02-03 | End: 2025-02-04

## 2025-02-03 RX ORDER — MIDAZOLAM HYDROCHLORIDE 1 MG/ML
1 INJECTION INTRAMUSCULAR; INTRAVENOUS EVERY 5 MIN PRN
Status: DISCONTINUED | OUTPATIENT
Start: 2025-02-03 | End: 2025-02-03 | Stop reason: HOSPADM

## 2025-02-03 RX ORDER — METOCLOPRAMIDE HYDROCHLORIDE 5 MG/ML
10 INJECTION INTRAMUSCULAR; INTRAVENOUS EVERY 8 HOURS PRN
Status: DISCONTINUED | OUTPATIENT
Start: 2025-02-03 | End: 2025-02-03 | Stop reason: HOSPADM

## 2025-02-03 RX ORDER — ACETAMINOPHEN 500 MG
1000 TABLET ORAL ONCE
Status: DISCONTINUED | OUTPATIENT
Start: 2025-02-03 | End: 2025-02-03 | Stop reason: HOSPADM

## 2025-02-03 RX ORDER — FAMOTIDINE 10 MG/ML
20 INJECTION, SOLUTION INTRAVENOUS 2 TIMES DAILY
Status: DISCONTINUED | OUTPATIENT
Start: 2025-02-03 | End: 2025-02-04

## 2025-02-03 RX ORDER — HYDROMORPHONE HYDROCHLORIDE 1 MG/ML
INJECTION, SOLUTION INTRAMUSCULAR; INTRAVENOUS; SUBCUTANEOUS
Status: COMPLETED
Start: 2025-02-03 | End: 2025-02-03

## 2025-02-03 RX ORDER — KETOROLAC TROMETHAMINE 30 MG/ML
15 INJECTION, SOLUTION INTRAMUSCULAR; INTRAVENOUS EVERY 6 HOURS
Status: DISCONTINUED | OUTPATIENT
Start: 2025-02-03 | End: 2025-02-04

## 2025-02-03 RX ORDER — APIXABAN 2.5 MG/1
2.5 TABLET, FILM COATED ORAL 2 TIMES DAILY
COMMUNITY
Start: 2025-01-28 | End: 2025-02-17

## 2025-02-03 RX ORDER — SODIUM CHLORIDE, SODIUM LACTATE, POTASSIUM CHLORIDE, CALCIUM CHLORIDE 600; 310; 30; 20 MG/100ML; MG/100ML; MG/100ML; MG/100ML
INJECTION, SOLUTION INTRAVENOUS CONTINUOUS
Status: DISCONTINUED | OUTPATIENT
Start: 2025-02-03 | End: 2025-02-04

## 2025-02-03 RX ORDER — OXYCODONE HYDROCHLORIDE 5 MG/1
TABLET ORAL EVERY 4 HOURS PRN
COMMUNITY

## 2025-02-03 RX ORDER — PRAVASTATIN SODIUM 10 MG
10 TABLET ORAL NIGHTLY
Status: DISCONTINUED | OUTPATIENT
Start: 2025-02-03 | End: 2025-02-04

## 2025-02-03 RX ORDER — HYDROMORPHONE HYDROCHLORIDE 1 MG/ML
0.6 INJECTION, SOLUTION INTRAMUSCULAR; INTRAVENOUS; SUBCUTANEOUS EVERY 5 MIN PRN
Status: DISCONTINUED | OUTPATIENT
Start: 2025-02-03 | End: 2025-02-03 | Stop reason: HOSPADM

## 2025-02-03 RX ORDER — ASPIRIN 81 MG/1
81 TABLET ORAL 2 TIMES DAILY
Status: ON HOLD | COMMUNITY
End: 2025-02-04

## 2025-02-03 RX ORDER — TRAMADOL HYDROCHLORIDE 50 MG/1
50 TABLET ORAL EVERY 6 HOURS SCHEDULED
Status: DISCONTINUED | OUTPATIENT
Start: 2025-02-03 | End: 2025-02-04

## 2025-02-03 RX ORDER — PREGABALIN 75 MG/1
75 CAPSULE ORAL DAILY
COMMUNITY

## 2025-02-03 RX ORDER — FERROUS SULFATE 325(65) MG
325 TABLET, DELAYED RELEASE (ENTERIC COATED) ORAL
Status: DISCONTINUED | OUTPATIENT
Start: 2025-02-04 | End: 2025-02-04

## 2025-02-03 RX ORDER — FAMOTIDINE 20 MG/1
20 TABLET, FILM COATED ORAL 2 TIMES DAILY
Status: DISCONTINUED | OUTPATIENT
Start: 2025-02-03 | End: 2025-02-04

## 2025-02-03 RX ORDER — DIPHENHYDRAMINE HCL 25 MG
25 CAPSULE ORAL EVERY 4 HOURS PRN
Status: DISCONTINUED | OUTPATIENT
Start: 2025-02-03 | End: 2025-02-04

## 2025-02-03 RX ORDER — POLYETHYLENE GLYCOL 3350 17 G/17G
17 POWDER, FOR SOLUTION ORAL DAILY PRN
Status: DISCONTINUED | OUTPATIENT
Start: 2025-02-03 | End: 2025-02-04

## 2025-02-03 RX ADMIN — DEXAMETHASONE SODIUM PHOSPHATE 8 MG: 4 MG/ML VIAL (ML) INJECTION at 10:17:00

## 2025-02-03 RX ADMIN — ROPIVACAINE HYDROCHLORIDE 2 ML: 5 INJECTION, SOLUTION EPIDURAL; INFILTRATION; PERINEURAL at 10:10:00

## 2025-02-03 RX ADMIN — SODIUM CHLORIDE, SODIUM LACTATE, POTASSIUM CHLORIDE, CALCIUM CHLORIDE: 600; 310; 30; 20 INJECTION, SOLUTION INTRAVENOUS at 11:53:00

## 2025-02-03 RX ADMIN — TRANEXAMIC ACID 1000 MG: 10 INJECTION, SOLUTION INTRAVENOUS at 10:20:00

## 2025-02-03 RX ADMIN — SODIUM CHLORIDE, SODIUM LACTATE, POTASSIUM CHLORIDE, CALCIUM CHLORIDE: 600; 310; 30; 20 INJECTION, SOLUTION INTRAVENOUS at 10:03:00

## 2025-02-03 RX ADMIN — ONDANSETRON 4 MG: 2 INJECTION INTRAMUSCULAR; INTRAVENOUS at 10:17:00

## 2025-02-03 RX ADMIN — MIDAZOLAM HYDROCHLORIDE 2 MG: 1 INJECTION INTRAMUSCULAR; INTRAVENOUS at 10:08:00

## 2025-02-03 NOTE — ANESTHESIA PROCEDURE NOTES
Spinal Block    Performed by: Fernando Delarosa MD  Authorized by: Fernando Delarosa MD      General Information and Staff    Start Time:   Anesthesiologist:  Fernando Delarosa MD  Performed by:  Anesthesiologist  Site identification: surface landmarks    Preanesthetic Checklist: patient identified, IV checked, risks and benefits discussed, monitors and equipment checked, pre-op evaluation, timeout performed, anesthesia consent and sterile technique used      Procedure Details    Patient Position:  Sitting  Prep: ChloraPrep    Monitoring:  Cardiac monitor, heart rate and continuous pulse ox  Approach:  Midline  Location:  L2-3  Injection Technique:  Single-shot    Needle    Needle Type:  Sprotte  Needle Gauge:  24 G  Needle Length:  3.5 in    Assessment    Sensory Level:   Events: clear CSF, CSF aspirated, well tolerated and blood negative      Additional Comments

## 2025-02-03 NOTE — OPERATIVE REPORT
OPERATIVE REPORT    Facility:  [facility]    Patient Name:  Dolores Finn    Age/Gender:  74 year old female  :  1950    MRN:  [id number]    Date of Operation:  2/3/2025    Preoperative Diagnosis:   LEFT HIP OSTEOARTHRITIS    Postoperative Diagnosis:   LEFT HIP OSTEOARTHRITIS    Procedure Performed:  LEFT TOTAL HIP ARTHROPLASTY    Surgeon:  DANIKA LIAO M.D.    Assistant:   1) Maribel Albert, ALBA, FNP-BC  2) Francisco J Lanier SA       Anesthesia:  EPIDURAL    Estimated Blood Loss:  150cc    Drain:  NONE    Complications:  NONE    Implants:   1.  Biomet G7 acetabular shell, size 52 mm (with 2 25mm screws)   2.  Neutral E1 highly cross-linked polyethylene liner   3.  Isabela Avenir cemented femoral stem, size 4, high offset   4.  36 mm, -3.5 Delta ceramic femoral head      Indications for Procedure:  Dolores Finn is a 74 year old female with osteoarthritis of the left hip who failed conservative management.  After consultation in the office and discussion regarding risks and benefits of surgical treatment, surgery was scheduled and informed consent obtained.      Description of Procedure:  The patient was taken to the operating room after the correct surgical site was marked in the preop holding area.  The patient was placed under anesthesia and placed in a supine position on the [place] orthopaedic table.  Preoperative antibiotics were given.  All bony prominences were padded.  The left hip was prepped and draped in usual, sterile surgical fashion.  Bony landmarks were palpated for incision and a direct anterior approach was performed to the hip between the interval of tensor fascia magalis and sartorius/rectus femoris.  Lateral femoral circumflex vessels were identified, isolated and cauterized.  An L-shaped capsulotomy was performed and the capsule was tagged for retraction.  Retractors were placed inside the hip capsule and further capsular release was performed inside the saddle of the femur as  well as down the medial aspect of the femoral neck.  Osteotomy of the femoral neck was performed using a sagittal saw.  A corkscrew was used to remove the femoral head.  Retractors were placed anterior and posterior to the acetabulum.  The hip labrum as well as the soft tissue in the cotyloid fossa were removed in their entirety.  Reaming was then performed using fluoroscopy to assess depth, anteversion and abduction angle.  The acetabular shell was then inserted again using fluoroscopy to assess abduction angle, anteversion and complete seating of the acetabular component. Two screws were drilled, measured and placed under fluoroscopy. A neutral polyethylene was then inserted and impacted without difficulty.    Attention was then turned to the femur where the femoral hook was placed around the femur just distal to the vastus tubercle and proximal to the tendon of the gluteus china.  The leg was then externally rotated, extended and adducted with traction completely released.  The mechanical lift arm on the table was used to hold the proximal femur carefully.  Capsule was then further released inside the trochanter until the entire inside of the greater trochanter was easily visualized.  The lateral femoral neck remnant was removed and the femur was broached.  After broaching up to a proper size, trial head and neck components were used to determine stem offset and head length.  Fluoroscopy was used to verify the position of the broach as well as the length and offset to determine final implant positions. The trials were removed and the canal was prepared for cement.  A cement restrictor was placed in the canal at an appropriate depth and cement was mixed and loaded into the cement gun.  The canal was irrigated and suctioned and filled with cement.  The stem was inserted and cement allowed to dry.  The head was impacted onto the stem.  There were no complications with insertion of the femoral components.  The hip  was then reduced under direct visualization.  Fluoroscopy again verified length, offset, implant position and stem fill of the femoral canal.  Fluoro also verified there were no iatrogenic fractures.  The wound was copiously irrigated and the tag sutures in the capsule were tied together closing the anterior hip capsule.  Local anesthetic and pain medicine was injected into the hip capsule and surrounding soft tissues.  The fascia magalis was closed with absorbable suture.  Skin was then closed in 2 layers with absorbable suture.  Dermabond was applied to the wound and allowed to dry before sterile dressings were applied.  There were no complications and the procedure went as planned.  The surgical assistant was present for the entire procedure and assisted with the approach, exposure, definitive surgery and closure.    The patient left the operating room in stable condition.    WBAT  Abx 24 hours  Pain control  Dvt ppx: ASA 81mg BID while admitted, once discharged begin eliquis 2.5mg BID for 2 weeks and then after that is completed ASA 81mg BID for 4 weeks  Follow up in 2 weeks      DANIKA LIAO M.D.

## 2025-02-03 NOTE — INTERVAL H&P NOTE
Pre-op Diagnosis: PRIMARY OSTEOARTHRITIS LEFT HIP    The above referenced H&P was reviewed by Baljeet Huerta MD on 2/3/2025, the patient was examined and no significant changes have occurred in the patient's condition since the H&P was performed.  I discussed with the patient and/or legal representative the potential benefits, risks and side effects of this procedure; the likelihood of the patient achieving goals; and potential problems that might occur during recuperation.  I discussed reasonable alternatives to the procedure, including risks, benefits and side effects related to the alternatives and risks related to not receiving this procedure.  We will proceed with procedure as planned.

## 2025-02-03 NOTE — ANESTHESIA PREPROCEDURE EVALUATION
PRE-OP EVALUATION    Patient Name: Dolores Finn    Admit Diagnosis: Primary osteoarthritis of both knees [M17.0]    Pre-op Diagnosis: Primary osteoarthritis of both knees [M17.0]    LEFT ANTERIOR TOTAL HIP ARTHROPLASTY    Anesthesia Procedure: LEFT ANTERIOR TOTAL HIP ARTHROPLASTY (Left: Hip)    Surgeon(s) and Role:     * Baljeet Huerta MD - Primary    Pre-op vitals reviewed.  Temp: 97.2 °F (36.2 °C)  Pulse: 65  Resp: 16  BP: 150/67  SpO2: 99 %  Body mass index is 33.76 kg/m².    Current medications reviewed.  Hospital Medications:   [Transfer Hold] acetaminophen (Tylenol Extra Strength) tab 1,000 mg  1,000 mg Oral Once    lactated ringers infusion   Intravenous Continuous    [Transfer Hold] tranexamic acid in sodium chloride 0.7% (Cyklokapron) 1000 mg/100mL infusion premix 1,000 mg  1,000 mg Intravenous Once    ceFAZolin (Ancef) 2g in 10mL IV syringe premix  2 g Intravenous Once    ceFAZolin (Ancef) 2 g/10mL IV syringe premix        povidone-iodine (Betadine) 10 % 17.5 mL in sodium chloride 0.9 % 500 mL irrigation   Irrigation Once (Intra-Op)    clonidine/epinephrine/ropivacaine/ketorolac in 0.9% NaCl 60 mL pain cocktail syringe for hip arthroplasty   Infiltration Once (Intra-Op)       Outpatient Medications:     Medications Prior to Admission   Medication Sig Dispense Refill Last Dose/Taking    albuterol 108 (90 Base) MCG/ACT Inhalation Aero Soln Inhale 2 puffs into the lungs every 6 (six) hours as needed for Wheezing or Shortness of Breath. 1 each 0 Past Month    celecoxib 200 MG Oral Cap Take 1 capsule (200 mg total) by mouth daily.   1/27/2025    lisinopril-hydroCHLOROthiazide 20-12.5 MG Oral Tab Take 1 tablet by mouth once daily. 90 tablet 0 2/1/2025    Lovastatin 10 MG Oral Tab TAKE ONE TABLET BY MOUTH NIGHTLY 90 tablet 0 2/1/2025    ergocalciferol 1.25 MG (84237 UT) Oral Cap Take 1 capsule (50,000 Units total) by mouth once a week. 6 capsule 0 Past Month    oxyCODONE 5 MG Oral Tab Take 0.5-1 tablets  (2.5-5 mg total) by mouth every 4 (four) hours as needed for Pain. Post op       ELIQUIS 2.5 MG Oral Tab Take 1 tablet (2.5 mg total) by mouth 2 (two) times daily. For 14 days. Post op       pregabalin 75 MG Oral Cap Take 1 capsule (75 mg total) by mouth daily. Post op       sennosides-docusate 8.6-50 MG Oral Tab Take 1 tablet by mouth daily. Post op       aspirin 81 MG Oral Tab EC Take 1 tablet (81 mg total) by mouth 2 (two) times daily. Post op  Take 1 tablet (81 mg total) by mouth 2 (two) times daily for 28 days. Begin after completing original 2 weeks of anticoagulation (ie eliquis/xarelto). To take for 4 weeks total.       traMADol 50 MG Oral Tab Take 1 tablet (50 mg total) by mouth every 4 to 6 hours as needed for Pain.  Post op .Take 1 tablet (50 mg total) by mouth every 4 to 6 hours as needed for Pain (Can alternate with Oxycodone). Do not take along with oxycodone or norco (hydrocodone) - separate by 2 hours if needed       acetaminophen 325 MG Oral Tab Take 1 tablet (325 mg total) by mouth every 4 (four) hours. ke 2 tablets (650 mg total) by mouth every 4 (four) hours for 14 days. Ok to take over the counter and ok to continue past 2 weeks. Do not exceed 4,000 mg per day.          Allergies: Patient has no known allergies.      Anesthesia Evaluation    Patient summary reviewed.    Anesthetic Complications  (-) history of anesthetic complications         GI/Hepatic/Renal    Negative GI/hepatic/renal ROS.                             Cardiovascular      ECG reviewed.      MET: >4      (+) hypertension                                     Endo/Other                           (+) arthritis       Pulmonary    Negative pulmonary ROS.           (+) shortness of breath            Neuro/Psych                              Mixed hyperlipidemia Essential hypertension  Osteopenia Osteoarthritis of left knee, unspecified osteoarthritis type  Visit for screening mammogram Impaired fasting glucose  Vitamin D  deficiency Colon cancer screening            Past Surgical History:   Procedure Laterality Date    Colonoscopy      Other surgical history      Oral tooth extraction surgery     Total abdom hysterectomy       Social History     Tobacco Use    Smoking status: Never     Passive exposure: Never    Smokeless tobacco: Never   Substance Use Topics    Alcohol use: No     Alcohol/week: 0.0 standard drinks of alcohol     History   Drug Use No     Available pre-op labs reviewed.  Lab Results   Component Value Date    WBC 5.9 12/25/2024    RBC 4.03 12/25/2024    HGB 12.5 12/25/2024    HCT 37.6 12/25/2024    MCV 93.3 12/25/2024    MCH 31.0 12/25/2024    MCHC 33.2 12/25/2024    RDW 14.4 12/25/2024    .0 12/25/2024     Lab Results   Component Value Date     12/25/2024    K 4.0 12/25/2024     12/25/2024    CO2 25.0 12/25/2024    BUN 15 12/25/2024    CREATSERUM 0.67 12/25/2024    GLU 96 12/25/2024    CA 9.8 12/25/2024            Airway      Mallampati: II  Mouth opening: >3 FB  TM distance: > 6 cm  Neck ROM: full Cardiovascular    Cardiovascular exam normal.         Dental      Dental appliance(s): upper dentures       Pulmonary    Pulmonary exam normal.                 Other findings              ASA: 2   Plan: spinal  NPO status verified and patient meets guidelines.        Comment:     Plan/risks discussed with: patient                Present on Admission:  **None**

## 2025-02-03 NOTE — ANESTHESIA POSTPROCEDURE EVALUATION
[facility]    Dolores Finn Patient Status:  Outpatient in a Bed   Age/Gender 74 year old female MRN [id number]   Location [facility] POST ANESTHESIA CARE UNIT Attending Baljeet Huerta MD   Hosp Day # 0 PCP Edvin Tomlinson MD       Anesthesia Post-op Note    LEFT ANTERIOR TOTAL HIP ARTHROPLASTY    Procedure Summary       Date: 02/03/25 Room / Location:  MAIN OR  /  MAIN OR    Anesthesia Start: 1003 Anesthesia Stop: 1236    Procedure: LEFT ANTERIOR TOTAL HIP ARTHROPLASTY (Left: Hip) Diagnosis: (PRIMARY OSTEOARTHRITIS LEFT HIP)    Surgeons: Baljeet Huerta MD Anesthesiologist: Fernando Delarosa MD    Anesthesia Type: spinal ASA Status: 2            Anesthesia Type: spinal    Vitals Value Taken Time   /61 02/03/25 1224   Temp 97.8 °F (36.6 °C) 02/03/25 1215   Pulse 66 02/03/25 1237   Resp 16 02/03/25 1237   SpO2 99 % 02/03/25 1237   Vitals shown include unfiled device data.        Patient Location: PACU    Anesthesia Type: spinal    Airway Patency: patent    Postop Pain Control: adequate    Mental Status: mildly sedated but able to meaningfully participate in the post-anesthesia evaluation    Nausea/Vomiting: none    Cardiopulmonary/Hydration status: stable euvolemic    Complications: no apparent anesthesia related complications    Postop vital signs: stable    Dental Exam: Unchanged from Preop

## 2025-02-03 NOTE — HOME CARE LIAISON
Spoke with daughter and patient is all set up for home health care services. Demographics have been confirmed

## 2025-02-03 NOTE — PLAN OF CARE
Patient alert and oriented x4. VSS on RA. Pain controlled with scheduled meds. Denies n/t. Aquacel dressing to left hip, CDI. Spanda  in place, gel ice at bedside. SCDs in place, ankle pumps encouraged, IS encouraged. Pt rolling side to side. DTV by 2200. Tolerating diet, no c/o n/v.    Plan: PT/OT eval, preop [facility]

## 2025-02-04 ENCOUNTER — APPOINTMENT (OUTPATIENT)
Dept: CT IMAGING | Facility: HOSPITAL | Age: 75
End: 2025-02-04
Attending: HOSPITALIST
Payer: MEDICARE

## 2025-02-04 VITALS
HEART RATE: 72 BPM | WEIGHT: 190.63 LBS | BODY MASS INDEX: 33.78 KG/M2 | HEIGHT: 63 IN | TEMPERATURE: 98 F | RESPIRATION RATE: 16 BRPM | DIASTOLIC BLOOD PRESSURE: 51 MMHG | OXYGEN SATURATION: 94 % | SYSTOLIC BLOOD PRESSURE: 127 MMHG

## 2025-02-04 LAB
ANION GAP SERPL CALC-SCNC: 8 MMOL/L (ref 0–18)
BUN BLD-MCNC: 13 MG/DL (ref 9–23)
CALCIUM BLD-MCNC: 8.9 MG/DL (ref 8.7–10.6)
CHLORIDE SERPL-SCNC: 105 MMOL/L (ref 98–112)
CO2 SERPL-SCNC: 26 MMOL/L (ref 21–32)
CREAT BLD-MCNC: 0.63 MG/DL
EGFRCR SERPLBLD CKD-EPI 2021: 92 ML/MIN/1.73M2 (ref 60–?)
GLUCOSE BLD-MCNC: 122 MG/DL (ref 70–99)
HCT VFR BLD AUTO: 31.1 %
HGB BLD-MCNC: 10.5 G/DL
OSMOLALITY SERPL CALC.SUM OF ELEC: 289 MOSM/KG (ref 275–295)
POTASSIUM SERPL-SCNC: 4 MMOL/L (ref 3.5–5.1)
SODIUM SERPL-SCNC: 139 MMOL/L (ref 136–145)

## 2025-02-04 PROCEDURE — 97165 OT EVAL LOW COMPLEX 30 MIN: CPT

## 2025-02-04 PROCEDURE — 97116 GAIT TRAINING THERAPY: CPT

## 2025-02-04 PROCEDURE — 97161 PT EVAL LOW COMPLEX 20 MIN: CPT

## 2025-02-04 PROCEDURE — 71275 CT ANGIOGRAPHY CHEST: CPT | Performed by: HOSPITALIST

## 2025-02-04 PROCEDURE — 80048 BASIC METABOLIC PNL TOTAL CA: CPT | Performed by: HOSPITALIST

## 2025-02-04 PROCEDURE — 85018 HEMOGLOBIN: CPT | Performed by: ORTHOPAEDIC SURGERY

## 2025-02-04 PROCEDURE — 97535 SELF CARE MNGMENT TRAINING: CPT

## 2025-02-04 PROCEDURE — 85014 HEMATOCRIT: CPT | Performed by: ORTHOPAEDIC SURGERY

## 2025-02-04 PROCEDURE — 97530 THERAPEUTIC ACTIVITIES: CPT

## 2025-02-04 RX ORDER — ASPIRIN 81 MG/1
81 TABLET ORAL 2 TIMES DAILY
Qty: 56 TABLET | Refills: 0 | Status: SHIPPED | OUTPATIENT
Start: 2025-02-18 | End: 2025-03-18

## 2025-02-04 NOTE — PHYSICAL THERAPY NOTE
PHYSICAL THERAPY EVALUATION - INPATIENT     Room Number: 351/351-A  Evaluation Date: 2/4/2025  Type of Evaluation: Initial  Physician Order: PT Eval and Treat    Presenting Problem: L NORA  Co-Morbidities : HTN, OA, hx DVT PE, HLD, DMII  Reason for Therapy: Mobility Dysfunction and Discharge Planning    Procedure:  L NORA  Dr. Huerta     Recent admission:  5/11/24 - 5/13/24: PE, B DVT>> [facility]    PHYSICAL THERAPY ASSESSMENT   Patient is a 74 year old female admitted 2/3/2025 for LTHA.   Patient is currently functioning near baseline with bed mobility, transfers, gait, and stair negotiation. Prior to admission, patient's baseline is independent.     Patient will benefit from continued skilled PT Services at discharge to promote prior level of function and safety with additional support and return home with home health PT.    PLAN  Patient has been evaluated and presents with no skilled Physical Therapy needs at this time.  Patient discharged from Physical Therapy services.  Please re-order if a new functional limitation presents during this admission.    PT Device Recommendation: Rolling walker    GOALS  Patient was able to achieve the following goals ...    Patient was able to transfer Safely with RW   Patient able to ambulate on level surfaces Safely with RW     HOME SITUATION  Type of Home: House  Home Layout: Two level  Stairs to Enter : 2   Railing: No    Stairs to Bedroom: 12    Railing: Yes    Lives With: Spouse (daughter will also be staying initially to assist, another daughter lives in the area)    Drives: No   Patient Regularly Uses: None     Prior Level of [place]:  Per daughter translating/patient: Pt lives in a two story houses with her . There are two stoop steps to enter without a rail. There are 12 stairs to the second floor with a rail. Pt occasionally uses cane as she needed. Pt denies any falls within the last 6 months. Pt does not drive. Daughter states pt has been mostly just walking  house hold distance due to knee pain.     SUBJECTIVE  Per daughters translation: \"my head feels heavy\"    OBJECTIVE  Precautions: Bed/chair alarm  Fall Risk: Standard fall risk    WEIGHT BEARING RESTRICTION  L Lower Extremity: Weight Bearing as Tolerated    PAIN ASSESSMENT  Rating: Unable to rate  Location: L hip  Management Techniques: Repositioning;Activity promotion;Body mechanics;Breathing techniques    COGNITION  Overall Cognitive Status:  WFL - within functional limits    RANGE OF MOTION AND STRENGTH ASSESSMENT  Upper extremity ROM and strength are within functional limits  Lower extremity ROM is limited as expected s/p NORA  Lower extremity strength is limited as expected s/p NORA    BALANCE  Static Sitting: Fair +  Dynamic Sitting: Fair  Static Standing: Fair -  Dynamic Standing: Fair -    ADDITIONAL TESTS                                    ACTIVITY TOLERANCE                         O2 WALK       NEUROLOGICAL FINDINGS                        AM-PAC '6-Clicks' INPATIENT SHORT FORM - BASIC MOBILITY  How much difficulty does the patient currently have...  Patient Difficulty: Turning over in bed (including adjusting bedclothes, sheets and blankets)?: A Little   Patient Difficulty: Sitting down on and standing up from a chair with arms (e.g., wheelchair, bedside commode, etc.): A Little   Patient Difficulty: Moving from lying on back to sitting on the side of the bed?: A Little   How much help from another person does the patient currently need...   Help from Another: Moving to and from a bed to a chair (including a wheelchair)?: A Little   Help from Another: Need to walk in hospital room?: A Little   Help from Another: Climbing 3-5 steps with a railing?: A Little       AM-PAC Score:  Raw Score: 18   Approx Degree of Impairment: 46.58%   Standardized Score (AM-PAC Scale): 43.63   CMS Modifier (G-Code): CK    FUNCTIONAL ABILITY STATUS  Gait Assessment   Functional Mobility/Gait Assessment  Gait Assistance: Contact  guard assist  Distance (ft): 50  Assistive Device: Rolling walker  Pattern: L Decreased stance time  Stairs: Car transfer;Stairs;Stoop/curb  How Many Stairs: 4  Device: 1 Rail;Cane  Assist: Contact guard assist  Pattern: Ascend and Descend  Ascend and Descend : Step to  Stoop/Curb: CGA with RW, VC for sequencing and reassurance  Car transfer: Min A, VC for sequencing, manual assistance to lift L LE in/out of bed    Skilled Therapy Provided   Educated on importance of continued out of bed mobility and ambulation   Educated on use of ice for management of pain and swelling  Educated on weight bearing as tolerated  Educated on HEP to be performed later today/when at home    Bed mobility> sit to stand x2> ambulated 50 feet with RW> therapeutic rest break> curb step> ascend/descend 4 stairs> car transfer> wheeled in chair back to room> up in chair at end of session     Bed Mobility:  Rolling: indp  Supine to sit: indp   Sit to supine: NT     Transfer Mobility:  Sit to stand: CGA to RW, VC for sequencing and hand placement  Stand to sit: Supervision, VC for hand placement  Gait = CGA 50 feet with RW. Pt demonstrated decreased kole and decrease stance time on L. Pt stated head felt heavy and needed to take a rest. BP was taken.    **Pt began experiencing fogginess and SOB following curb step. Pt BP was taken in gym and in room post session. Pt and family have increased concern of symptoms follow PE with knee replacement surgery.     Sitting in gym : 126/58  Sitting in room: 134/44    Therapist's comments: RN gave clearance to see patient. Daughter present and willing to translate during session. Discussed role and goal of physical therapy in hospital setting. Pt in agreement to session.         Exercise/Education Provided:  Bed mobility  Body mechanics  E-stim / TENS  Functional activity tolerated  Gait training  ROM  Strengthening  Transfer training    Patient End of Session: Up in chair;With  staff;Needs met;Call  light within reach;RN aware of session/findings;All patient questions and concerns addressed;Hospital anti-slip socks;Alarm set;Family present    Patient Evaluation Complexity Level:  History Low - no personal factors and/or co-morbidities   Examination of body systems Low -  addressing 1-2 elements   Clinical Presentation Low- Stable   Clinical Decision Making Low Complexity       PT Session Time: 70 minutes  Gait Trainin minutes  Therapeutic Activity: 15 minutes    Increased length of session due to translation, onset of symptoms and increased time to perform activities/transfers.

## 2025-02-04 NOTE — PROGRESS NOTES
Progress Note    Patient: Dolores Finn  Medical record #: [id number]    Dolores Finn is a 74 year old  female who is POD #1 Left NORA.  The patient's main complaint today is surgical pain at the operative site. Denies paresthesias/CP/SOA. Patient has been working with physical therapy.      Hospital Problem List:  Active Problems:    * No active hospital problems. *      Current Medications:   lactated ringers infusion   Intravenous Continuous    [COMPLETED] tranexamic acid in sodium chloride 0.7% (Cyklokapron) 1000 mg/100mL infusion premix 1,000 mg  1,000 mg Intravenous Once    [COMPLETED] ceFAZolin (Ancef) 2g in 10mL IV syringe premix  2 g Intravenous Once    [COMPLETED] ceFAZolin (Ancef) 2 g/10mL IV syringe premix        sodium chloride 0.9 % IV bolus 500 mL  500 mL Intravenous Once PRN    sennosides (Senokot) tab 17.2 mg  17.2 mg Oral Nightly    docusate sodium (Colace) cap 100 mg  100 mg Oral BID    polyethylene glycol (PEG 3350) (Miralax) 17 g oral packet 17 g  17 g Oral Daily PRN    magnesium hydroxide (Milk of Magnesia) 400 MG/5ML oral suspension 30 mL  30 mL Oral Daily PRN    bisacodyl (Dulcolax) 10 MG rectal suppository 10 mg  10 mg Rectal Daily PRN    fleet enema (Fleet) rectal enema 133 mL  1 enema Rectal Once PRN    ondansetron (Zofran) 4 MG/2ML injection 4 mg  4 mg Intravenous Q6H PRN    metoclopramide (Reglan) 5 mg/mL injection 10 mg  10 mg Intravenous Q8H PRN    famotidine (Pepcid) tab 20 mg  20 mg Oral BID    Or    famotidine (Pepcid) 20 mg/2mL injection 20 mg  20 mg Intravenous BID    [] diphenhydrAMINE (Benadryl) 50 mg/mL  injection 25 mg  25 mg Intravenous Once PRN    diphenhydrAMINE (Benadryl) cap/tab 25 mg  25 mg Oral Q4H PRN    Or    diphenhydrAMINE (Benadryl) 50 mg/mL  injection 12.5 mg  12.5 mg Intravenous Q4H PRN    ferrous sulfate DR tab 325 mg  325 mg Oral Daily with breakfast    aspirin DR tab 81 mg  81 mg Oral BID    [COMPLETED] tranexamic acid in sodium chloride 0.7%  (Cyklokapron) 1000 mg/100mL infusion premix 1,000 mg  1,000 mg Intravenous Once    ceFAZolin (Ancef) 2g in 10mL IV syringe premix  2 g Intravenous Q8H    tiZANidine (Zanaflex) partial tab 1 mg  1 mg Oral Q6H PRN    dexAMETHasone PF (Decadron) 10 MG/ML injection 8 mg  8 mg Intravenous Once    traMADol (Ultram) tab 50 mg  50 mg Oral 4 times per day    oxyCODONE immediate release partial tab 2.5 mg  2.5 mg Oral Q4H PRN    Or    oxyCODONE immediate release tab 5 mg  5 mg Oral Q4H PRN    HYDROmorphone (Dilaudid) 1 MG/ML injection 0.2 mg  0.2 mg Intravenous Q2H PRN    Or    HYDROmorphone (Dilaudid) 1 MG/ML injection 0.4 mg  0.4 mg Intravenous Q2H PRN    acetaminophen (Tylenol) tab 650 mg  650 mg Oral Q4H While awake    ketorolac (Toradol) 30 MG/ML injection 15 mg  15 mg Intravenous Q6H    [COMPLETED] tranexamic acid in sodium chloride 0.7% (Cyklokapron) 1000 mg/100mL infusion premix        [COMPLETED] fentaNYL (Sublimaze) 50 mcg/mL injection        [COMPLETED] HYDROmorphone (Dilaudid) 1 MG/ML injection        [COMPLETED] HYDROmorphone (Dilaudid) 1 MG/ML injection        pravastatin (Pravachol) tab 10 mg  10 mg Oral Nightly    lisinopril (Prinivil; Zestril) 20 mg, hydroCHLOROthiazide 12.5 mg for Zestoretic 20-12.5 ([facility] only)   Oral Daily    [COMPLETED] povidone-iodine (Betadine) 10 % 17.5 mL in sodium chloride 0.9 % 500 mL irrigation   Irrigation Once (Intra-Op)    [COMPLETED] clonidine/epinephrine/ropivacaine/ketorolac in 0.9% NaCl 60 mL pain cocktail syringe for hip arthroplasty   Infiltration Once (Intra-Op)         Input/Output:    Intake/Output Summary (Last 24 hours) at 2/4/2025 0777  Last data filed at 2/4/2025 0550  Gross per 24 hour   Intake 1000 ml   Output 800 ml   Net 200 ml       Vital signs:  Blood pressure 132/37, pulse 75, temperature 98.2 °F (36.8 °C), temperature source Oral, resp. rate 16, height 5' 3\" (1.6 m), weight 190 lb 9.6 oz (86.5 kg), SpO2 98%, not currently breastfeeding.    Physical Exam:      Left Leg:  Dressing: clean and dry  Able to dorsiflex ankle and move toes  Sensation grossly intact to light touch throughout  Distal pulses intact  No calf swelling  Cholo's sign negative  Compartments soft  No signs or symptoms of DVT or compartment syndrome      Labs:   Lab Results   Component Value Date    HGB 10.5 02/04/2025    HCT 31.1 02/04/2025         Assessment: 74 year old  female POD #1 Left NORA    Plan:    Hgb - 10.5 - acute blood loss anemia, consistent with post op changes, stable/asymptomatic  April-op Glucose Goal control <140  TEDs, SCDs, IS  mobilize with PT, WBAT on operative leg  pain controlled with meds  According to CHEST and AAOS guidelines, ASA EC 81 mg po bid for DVT prophylaxis due to bleeding concerns  Disposition: plan discharge today if doing well with PT, consider home vs rehab if needed    Follow up with Dr. Huerta in 2 weeks    Followed by Physician group for medical conditions to include Active Problems:    * No active hospital problems. *        Signed:  RANJITH Ochoa  2/4/2025  7:34 AM

## 2025-02-04 NOTE — PROGRESS NOTES
[facility] Hospitalist Progress Note                                                                     [facility]   part of [facility]      Dolores Finn  7/20/1950    SUBJECTIVE: no chest pain, palpitations, nausea, vomiting, abdominal pain. Pt with no sob earlier today but had some latter in day with activity.     OBJECTIVE:  Temp:  [97.7 °F (36.5 °C)-98.2 °F (36.8 °C)] 98 °F (36.7 °C)  Pulse:  [63-75] 72  Resp:  [13-17] 16  BP: (105-134)/(34-64) 127/51  SpO2:  [94 %-98 %] 94 %  Exam  Gen: No acute distress, alert and oriented  Pulm: Lungs clear bilaterally, normal respiratory effort, no crackles, no wheezing  CV: Heart with regular rate and rhythm, no murmur.  Abd: Abdomen soft, nontender, nondistended, bowel sounds present  MSK: No significant pitting edema or tenderness of the LE  Skin: no new rashes or lesions    Labs:   Recent Labs   Lab 02/04/25  0518   HGB 10.5*       Recent Labs   Lab 02/04/25  1018      K 4.0      CO2 26.0   BUN 13   CREATSERUM 0.63   CA 8.9   *       No results for input(s): \"ALT\", \"AST\", \"ALB\", \"AMYLASE\", \"LIPASE\", \"LDH\" in the last 168 hours.    Invalid input(s): \"ALPHOS\", \"TBIL\", \"DBIL\", \"TPROT\"    No results for input(s): \"PGLU\" in the last 168 hours.    Meds:   Scheduled:    sennosides  17.2 mg Oral Nightly    docusate sodium  100 mg Oral BID    famotidine  20 mg Oral BID    Or    famotidine  20 mg Intravenous BID    ferrous sulfate  325 mg Oral Daily with breakfast    aspirin  81 mg Oral BID    traMADol  50 mg Oral 4 times per day    acetaminophen  650 mg Oral Q4H While awake    ketorolac  15 mg Intravenous Q6H    pravastatin  10 mg Oral Nightly    lisinopril (Prinivil; Zestril) 20 mg, hydroCHLOROthiazide 12.5 mg for Zestoretic 20-12.5 ([facility] only)   Oral Daily     Continuous Infusions:    lactated ringers Stopped (02/03/25 1900)     PRN:   sodium chloride    polyethylene glycol (PEG 3350)    magnesium  hydroxide    bisacodyl    fleet enema    ondansetron    metoclopramide    diphenhydrAMINE **OR** diphenhydrAMINE    tiZANidine    oxyCODONE **OR** oxyCODONE    HYDROmorphone **OR** HYDROmorphone    ASSESSMENT / PLAN:   74 year old female with history of hypertension, hyperlipidemia, pulmonary embolism presenting with left hip osteoarthritis status post left total hip arthroplasty.      Left hip osteoarthritis  -POD # 1 status post left total hip arthroplasty.   -ortho following --> ok for dc  -PT/OT--> ok for dc     Post Operative Pain  -acetaminophen po atc  -decadron iv x1  -toradol iv atc  -tramadol po atc  -dc pain meds per surgical service      HTN  -sbp stable  -lisinopril/hctz     HLD  -pravastatin      Hx PE    SOB  -given recent ortho surgery and hx PE will check CT chest to r/o PE --> wnl  -no hypoxic at this time    Dizziness  -orthostatics --> no acute pathology  -no focal deficit concerning for cva  -currently resolved --> family ok with going home        Quality:  DVT Prophylaxis: scd, asa  CODE status:   Mora:      Plan of care discussed with patient and staff, family at bedside translating, pt agreeable     Dispo: medically stable for discharge     Rick Stephens MD  [facility]ist  [phone]

## 2025-02-04 NOTE — CM/SW NOTE
02/04/25 0900   CM/SW Referral Data   Referral Source Social Work (self-referral)   Reason for Referral Discharge planning   Informant EMR;Clinical Staff Member   Discharge Needs   Anticipated D/C needs Home health care       Patient is a 73 y/o woman admitted s/p THR.  Pt with pre-operative plan for [facility] [place].  PT recommending [facility] services at discharge.  Maddie from [facility] confirmed pt accepted for [facility] services at [place].  No other [place] needs/concerns identified at this time.  / to remain available for support and/or discharge planning.     Kiarra Browne, [facility]  Discharge Planner  [phone]

## 2025-02-04 NOTE — OCCUPATIONAL THERAPY NOTE
OCCUPATIONAL THERAPY EVALUATION - INPATIENT     Room Number: 351/351-A  Evaluation Date: 2/4/2025  Type of Evaluation: Initial  Presenting Problem: s/p L NORA 2/3/25    Physician Order: IP Consult to Occupational Therapy  Reason for Therapy: ADL/IADL Dysfunction and Discharge Planning    OCCUPATIONAL THERAPY ASSESSMENT   Patient is currently functioning near baseline with ADLs and functional transfers. Prior to admission, patient's baseline is independent except for assist with sock/shoe management.  Patient is requiring supervision to CGA in all other areas as a result of the following impairments: decreased functional strength, decreased endurance, decreased muscular endurance, and SOB . Occupational Therapy will continue to follow for duration of hospitalization.    Patient will benefit from continued skilled OT Services with no additional skilled services but increased support at home    History Related to Current Admission: Patient is a 74 year old female admitted on 2/3/2025 with Presenting Problem: s/p L NORA 2/3/25. Co-Morbidities : HTN, OA, hx DVT PE, HLD, DMII    WEIGHT BEARING RESTRICTION  L Lower Extremity: Weight Bearing as Tolerated      Recommendations for nursing staff:   Transfers: 1-person  Toileting location: toilet    EVALUATION SESSION:  Patient Start of Session: chair    FUNCTIONAL TRANSFER ASSESSMENT  Sit to Stand: Chair  Chair: Supervision    BED MOBILITY  Sit to Supine (OT): Contact Guard Assist (for LLE)    BALANCE ASSESSMENT     FUNCTIONAL ADL ASSESSMENT  UB Dressing Seated: Modified Independent  LB Dressing Seated: Supervision (donning underwear and pants to knees without AE; sock/shoes deferred at this time, reports  can assist if needed)  LB Dressing Standing: Supervision  Toileting Standing: Supervision (clothing management to waist)    ACTIVITY TOLERANCE: very SOB and mild dizziness with activity, SPO2 remained 92-97% throughout on room air, /50 sitting EOB; RN aware                          O2 SATURATIONS       COGNITION  Overall Cognitive Status:  WFL - within functional limits    Upper Extremity   ROM: within functional limits   Strength: within functional limits     EDUCATION PROVIDED  Patient Education : Role of Occupational Therapy; Functional Transfer Techniques; Fall Prevention; Weight Bear Status; Posture/Positioning; Proper Body Mechanics  Patient's Response to Education: Verbalized Understanding; Requires Further Education ( and daughters present)    Equipment used: RW  Demonstrates functional use, Would benefit from additional trial      Therapist comments: Patient motivated and participatory, primarily limited by fatigue, SOB, mild dizziness this session; vitals WNL, RN notified. Patient educated on safety precautions and incorporation into ADLs and transfers, followed with good return demo. Functional mobility between chair and bed via RW close SBA and greatly increased time. See Functional Assessment sections above for additional information on patient's performance on ADLs and functional transfers this session. Will continue to follow.     Patient End of Session: Needs met;Call light within reach;RN aware of session/findings;In bed;All patient questions and concerns addressed;Hospital anti-slip socks;SCDs in place;Alarm set;Ice applied;Family present    OCCUPATIONAL PROFILE    HOME SITUATION  Type of Home: House  Home Layout: Two level  Lives With: Spouse (daughter will also be staying initially to assist, another daughter lives in the area)    Toilet and Equipment: Standard height toilet;3-in-1 commode  Shower/Tub and Equipment: Walk-in shower;Shower chair             Drives: No  Patient Regularly Uses: None    Prior Level of Function: Patient reports independent in all I/BADLs and functional mobility via cane PRN prior to admission.  is home at all times to assist; one daughter staying first week to assist, other daughter lives in area.    SUBJECTIVE    \"Thank you for your help\" [via daughter translating]    PAIN ASSESSMENT  Ratin  Location: denies       OBJECTIVE  Precautions: Bed/chair alarm  Fall Risk: Standard fall risk    ASSESSMENTS    AM-PAC ‘6-Clicks’ Inpatient Daily Activity Short Form  -   Putting on and taking off regular lower body clothing?: A Lot  -   Bathing (including washing, rinsing, drying)?: A Little  -   Toileting, which includes using toilet, bedpan or urinal? : A Little  -   Putting on and taking off regular upper body clothing?: None  -   Taking care of personal grooming such as brushing teeth?: None  -   Eating meals?: None    AM-PAC Score:  Score: 20  Approx Degree of Impairment: 38.32%  Standardized Score (AM-PAC Scale): 42.03    ADDITIONAL TESTS     NEUROLOGICAL FINDINGS      COGNITION ASSESSMENTS     PLAN  OT Device Recommendations: TBD  OT Treatment Plan: Balance activities;ADL training;Energy conservation/work simplification techniques;Functional transfer training;Endurance training;Patient/Family education;Patient/Family training;Compensatory technique education  Rehab Potential : Good  Frequency: 3x/week  Number of Visits to Meet Established Goals: 2    ADL GOALS   Patient will perform lower body dressing with supervision  Patient will perform toileting with supervision    FUNCTIONAL TRANSFER GOALS   Patient will perform supine to sit with supervision  Patient will perform sit to supine with supervision  Patient will transfer to toilet with supervision    Patient Evaluation Complexity Level:   Occupational Profile/Medical History LOW - Brief history including review of medical or therapy records    Specific performance deficits impacting engagement in ADL/IADL LOW  1 - 3 performance deficits    Client Assessment/Performance Deficits LOW - No comorbidities nor modifications of tasks    Clinical Decision Making LOW - Analysis of occupational profile, problem-focused assessments, limited treatment options    Overall Complexity  LOW     OT Session Time: 25 minutes  Self-Care Home Management: 10 minutes

## 2025-02-04 NOTE — PROGRESS NOTES
Patient feeling SOB when working with PT/OT, has history of PE. Dr. Stephens notified -  STAT labs ordered.

## 2025-02-04 NOTE — PLAN OF CARE
Patient alert and oriented x4. VSS on RA. Pain controlled with scheduled meds. Denies n/t. Aquacel dressing to left hip, CDI. Spanda  in place, gel ice at bedside. SCDs in place, ankle pumps encouraged, IS encouraged. Pt up x1 with the walker. Voiding freely in bathroom. N/v relieved with antiemetic.     Plan: PT/OT eval, preop [facility]

## 2025-02-04 NOTE — CONSULTS
[facility]   part of [facility]    History and Physical     Dolores Finn Patient Status:  Outpatient in a Bed    1950 MRN [id number]   Location [facility] 3SW-A Attending Baljeet Huerta MD   Hosp Day # 0 PCP Edvin Tomlinson MD     Chief Complaint: Left hip osteoarthritis    History of Present Illness: Dolores Finn is a 74 year old female with history of hypertension, hyperlipidemia, pulmonary embolism presenting with left hip osteoarthritis status post left total hip arthroplasty.  Patient denies any preoperative positive review of systems.  Patient denies any acute issues currently.  Patient's pain is controlled.    Past Medical History:  Past Medical History:    Essential hypertension    High blood pressure    High cholesterol    Osteoarthritis    Other acute pulmonary embolism without acute cor pulmonale (HCC)    Pulmonary embolism (HCC)        Past Surgical History:   Past Surgical History:   Procedure Laterality Date    Colonoscopy      Other surgical history      Oral tooth extraction surgery     Total abdom hysterectomy     Right tka    Social History:  reports that she has never smoked. She has never been exposed to tobacco smoke. She has never used smokeless tobacco. She reports that she does not drink alcohol and does not use drugs.no illegal drugs, , 8 childre, live with , use a cane    Family History:   Family History   Problem Relation Age of Onset    Cancer Father         stomach     Cancer Sister         ovarian    Cancer Brother         lung    Cancer Sister         ovarian   Mother passed  Father passed    Allergies: Allergies[1]    Medications:  Medications Ordered Prior to Encounter[2]    Review of Systems:   A comprehensive 14 point review of systems was completed.    Pertinent positives and negatives noted in the HPI.    Physical Exam:    /64 (BP Location: Left arm)   Pulse 67   Temp 97.7 °F (36.5 °C) (Oral)   Resp 16   Ht 5' 3\" (1.6 m)   Wt  190 lb 9.6 oz (86.5 kg)   LMP  (LMP Unknown)   SpO2 98%   BMI 33.76 kg/m²   General: No acute distress. Alert and oriented   HEENT: Normocephalic atraumatic. Moist mucous membranes. EOM-I.  Neck: No JVD. No carotid bruits.  Respiratory: Clear to auscultation bilaterally. No wheezes. No crackles  Cardiovascular: S1, S2. Regular rate and rhythm. No murmurs  Chest and Back: No tenderness or deformity.  Abdomen: Soft, nontender, nondistended.  Positive bowel sounds. No rebound, guarding   Neurologic: No focal neurological deficits. CNII-XII grossly intact. Sensation and strength intact  Musculoskeletal: Moves all extremities.  Extremities: No significant pitting edema or tenderness of the LE  Integument: No new rashes or lesions.   Psychiatric: Appropriate mood and affect.      Diagnostic Data:      Labs:  No results for input(s): \"WBC\", \"HGB\", \"MCV\", \"PLT\", \"BAND\", \"INR\" in the last 168 hours.    Invalid input(s): \"LYM#\", \"MONO#\", \"BASOS#\", \"EOSIN#\"    No results for input(s): \"GLU\", \"BUN\", \"CREATSERUM\", \"GFRAA\", \"GFRNAA\", \"CA\", \"ALB\", \"NA\", \"K\", \"CL\", \"CO2\", \"ALKPHO\", \"AST\", \"ALT\", \"BILT\", \"TP\" in the last 168 hours.    CrCl cannot be calculated (Patient's most recent lab result is older than the maximum 7 days allowed.).    No results for input(s): \"PTP\", \"INR\" in the last 168 hours.    No results for input(s): \"TROP\", \"CK\" in the last 168 hours.    Imaging: Imaging data reviewed in Epic.      ASSESSMENT / PLAN:   74 year old female with history of hypertension, hyperlipidemia, pulmonary embolism presenting with left hip osteoarthritis status post left total hip arthroplasty.     Left hip osteoarthritis  -POD # 0 status post left total hip arthroplasty.   -ortho following  -PT/OT    Post Operative Pain  -acetaminophen po atc  -decadron iv x1  -toradol iv atc  -tramadol po atc    HTN  -sbp stable  -lisinopril/hctz    HLD  -pravastatin      Hx PE    Quality:  DVT Prophylaxis: scd, asa  CODE status:   Mora:     Plan  of care discussed with patient and staff, family at bedside translating, pt agreeable    Dispo: no discharge    MD Shana Paulino Hospitalist  [phone]                           [1] No Known Allergies  [2]   No current facility-administered medications on file prior to encounter.     Current Outpatient Medications on File Prior to Encounter   Medication Sig Dispense Refill    albuterol 108 (90 Base) MCG/ACT Inhalation Aero Soln Inhale 2 puffs into the lungs every 6 (six) hours as needed for Wheezing or Shortness of Breath. 1 each 0    celecoxib 200 MG Oral Cap Take 1 capsule (200 mg total) by mouth daily.      lisinopril-hydroCHLOROthiazide 20-12.5 MG Oral Tab Take 1 tablet by mouth once daily. 90 tablet 0    Lovastatin 10 MG Oral Tab TAKE ONE TABLET BY MOUTH NIGHTLY 90 tablet 0    ergocalciferol 1.25 MG (11831 UT) Oral Cap Take 1 capsule (50,000 Units total) by mouth once a week. 6 capsule 0    oxyCODONE 5 MG Oral Tab Take 0.5-1 tablets (2.5-5 mg total) by mouth every 4 (four) hours as needed for Pain. Post op      ELIQUIS 2.5 MG Oral Tab Take 1 tablet (2.5 mg total) by mouth 2 (two) times daily. For 14 days. Post op      pregabalin 75 MG Oral Cap Take 1 capsule (75 mg total) by mouth daily. Post op      sennosides-docusate 8.6-50 MG Oral Tab Take 1 tablet by mouth daily. Post op      aspirin 81 MG Oral Tab EC Take 1 tablet (81 mg total) by mouth 2 (two) times daily. Post op  Take 1 tablet (81 mg total) by mouth 2 (two) times daily for 28 days. Begin after completing original 2 weeks of anticoagulation (ie eliquis/xarelto). To take for 4 weeks total.      traMADol 50 MG Oral Tab Take 1 tablet (50 mg total) by mouth every 4 to 6 hours as needed for Pain.  Post op .Take 1 tablet (50 mg total) by mouth every 4 to 6 hours as needed for Pain (Can alternate with Oxycodone). Do not take along with oxycodone or norco (hydrocodone) - separate by 2 hours if needed      acetaminophen 325 MG Oral Tab Take 1 tablet  (325 mg total) by mouth every 4 (four) hours. ke 2 tablets (650 mg total) by mouth every 4 (four) hours for 14 days. Ok to take over the counter and ok to continue past 2 weeks. Do not exceed 4,000 mg per day.

## 2025-02-04 NOTE — PLAN OF CARE
A&Ox4. VSS. On room air. /IS. SCDs. Ankle pumps encouraged. Tolerating diet. Last BM 2/3. Voiding. Pain managed with scheduled regimen. Surgical dressing to left hip C/D/I, spandagrip on, gel ice in place. WBAT. Ambulating with min assist with walker. Plan is for PT/OT eval. Patient and family updated and in agreement with plan of care. Safety precautions in place. Instructed patient to call for assistance, call light within reach.

## 2025-02-04 NOTE — PLAN OF CARE
Received pt at 2330. Pt is s/p L total hip artroplasty. Pt is A&O x 4; follows commands, primarily Slovenian speaking with daughter at bedside. Pt is on RA, VSS, reg diet. Pt is continent of bowel and bladder. Pt's pain is managed with scheduled Toradol, Tramadol, and Tylenol. Pt ambulates with 1 assist and walker. IV access is patent currently SL but receiving IV ancef Q8. Shift assessment & Q4 neuro checks performed, HS meds given. NOC safety plan in place; bed in low position, call light and personal items within reach, pt encouraged to call staff for assistance.    POC:  Discharge home with HH  F/U in 2 weeks

## 2025-02-04 NOTE — PROGRESS NOTES
AVS reviewed, daughter at bedside, will be on Eliquis 2.5mg po bid x 2 weeks then baby ASA x 1 month - verbalized understanding of meds,will dc home w/ [facility], IV's x 2 dc'd.

## 2025-03-06 ENCOUNTER — APPOINTMENT (OUTPATIENT)
Dept: WOUND CARE | Facility: HOSPITAL | Age: 75
End: 2025-03-06
Payer: MEDICARE

## 2025-03-06 NOTE — PROGRESS NOTES
CHIEF COMPLAINT:   No chief complaint on file.    HPI:   Information obtained from ***  3-6-25 Initial:  74 year old female with history of hypertension, hyperlipidemia, pulmonary embolism status post left total hip arthroplasty with Dr. Huerta on 2-3-25.  The dehiscence was noted on 2-28-25, patient has been dressing with ***. Patient is participating in outpatient PT. ***    MEDICATIONS:     Current Outpatient Medications:     aspirin 81 MG Oral Tab EC, Take 1 tablet (81 mg total) by mouth 2 (two) times daily for 28 days. Post op Take 1 tablet (81 mg total) by mouth 2 (two) times daily for 28 days. Begin after completing original 2 weeks of anticoagulation (ie eliquis/xarelto). To take for 4 weeks total., Disp: 56 tablet, Rfl: 0    oxyCODONE 5 MG Oral Tab, Take 0.5-1 tablets (2.5-5 mg total) by mouth every 4 (four) hours as needed for Pain. Post op, Disp: , Rfl:     pregabalin 75 MG Oral Cap, Take 1 capsule (75 mg total) by mouth daily. Post op, Disp: , Rfl:     sennosides-docusate 8.6-50 MG Oral Tab, Take 1 tablet by mouth daily. Post op, Disp: , Rfl:     traMADol 50 MG Oral Tab, Take 1 tablet (50 mg total) by mouth every 4 to 6 hours as needed for Pain.  Post op .Take 1 tablet (50 mg total) by mouth every 4 to 6 hours as needed for Pain (Can alternate with Oxycodone). Do not take along with oxycodone or norco (hydrocodone) - separate by 2 hours if needed, Disp: , Rfl:     albuterol 108 (90 Base) MCG/ACT Inhalation Aero Soln, Inhale 2 puffs into the lungs every 6 (six) hours as needed for Wheezing or Shortness of Breath., Disp: 1 each, Rfl: 0    celecoxib 200 MG Oral Cap, Take 1 capsule (200 mg total) by mouth daily., Disp: , Rfl:     lisinopril-hydroCHLOROthiazide 20-12.5 MG Oral Tab, Take 1 tablet by mouth once daily., Disp: 90 tablet, Rfl: 0    Lovastatin 10 MG Oral Tab, TAKE ONE TABLET BY MOUTH NIGHTLY, Disp: 90 tablet, Rfl: 0    ergocalciferol 1.25 MG (39254 UT) Oral Cap, Take 1 capsule (50,000 Units total)  by mouth once a week., Disp: 6 capsule, Rfl: 0  ALLERGIES:   Allergies[1]   REVIEW OF SYSTEMS:   This information was obtained from the patient/family and chart.    See HPI for pertinent positives, otherwise 10 pt ROS negative.  Review of Systems   HISTORY:     Past Medical History:    Essential hypertension    High blood pressure    High cholesterol    Osteoarthritis    Other acute pulmonary embolism without acute cor pulmonale (HCC)    Pulmonary embolism (HCC)     Past Surgical History:   Procedure Laterality Date    Colonoscopy      Other surgical history      Oral tooth extraction surgery     Total abdom hysterectomy        Social History     Socioeconomic History    Marital status:    Tobacco Use    Smoking status: Never     Passive exposure: Never    Smokeless tobacco: Never   Substance and Sexual Activity    Alcohol use: No     Alcohol/week: 0.0 standard drinks of alcohol    Drug use: No     Social Drivers of Health     Food Insecurity: No Food Insecurity (2/3/2025)    Food Insecurity     Food Insecurity: Never true   Transportation Needs: No Transportation Needs (2/3/2025)    Transportation Needs     Lack of Transportation: No   Housing Stability: Low Risk  (2/3/2025)    Housing Stability     Housing Instability: No     PHYSICAL EXAM:   There were no vitals filed for this visit.   Estimated body mass index is 33.76 kg/m² as calculated from the following:    Height as of 2/3/25: 63\".    Weight as of 2/3/25: 190 lb 9.6 oz (86.5 kg).   No results found for: \"PGLU\"    Vital signs reviewed.Appears stated age, well groomed.    Constitutional:  Bp ***wnl for patient. Pulse Regular and wnl for patient. Respirations easy and unlabored. Temperature wnl. Weight ***normal for height. Appearance neat and clean. Appears in no acute distress. Well nourished and well developed.    Respiratory: Respiratory ***effort is easy and symmetric bilaterally. Rate is normal at rest and on ***room air.  Bilateral breath sounds  are clear and equal w/ no wheezes, rales or rhonchi.    Cardiovascular:  Heart rhythm and rate regular, without murmur or gallop. ***Abnormal : irregularly irregular, +murmur    Musculoskeletal:  Gait and station stable ***  Gait independent with assistance of (cane, walker, crutches, knee roller)  Patient is in wheelchair propelled by others, able to transfer with assist  Patient is in a motor scooter/chair, able to transfer with assist  Patient is dependent for all transfers and mobility  Independent with assistance of a wheelchair, able to transfer with slideboard  NWB, pivot transfer with assist  Integumentary:  refer to wound characteristics and images   Psychiatric:  Judgment and insight intact. Alert and oriented times 3. No evidence of depression, anxiety, or agitation. Calm, cooperative, and communicative. ***Appropriate interactions and affect.  DIAGNOSTICS:     Lab Results   Component Value Date    BUN 13 02/04/2025    CREATSERUM 0.63 02/04/2025    GFRCKDEPI 97.63 12/28/2021    ALB 4.2 12/25/2024    TP 6.9 12/25/2024    A1C 5.4 10/13/2021       WOUND ASSESSMENT:     Wound 05/06/24 Abdomen (Active)   Date First Assessed/Time First Assessed: 05/06/24 0855   Primary Wound Type: Incision  Location: Abdomen  Wound Description (Comments): laparoscopic sites times five      Assessments 5/6/2024  8:58 AM 5/6/2024 12:47 PM   Site Assessment Clean;Dry;Intact --   Closure Approximated --   Dressing Dermabond Dermabond   Dressing Changed New --   Dressing Status Clean;Dry;Intact Clean;Dry;Intact       No associated orders.       Wound 05/06/24 Vagina (Active)   Date First Assessed/Time First Assessed: 05/06/24 0858   Primary Wound Type: Other (comment)  Location: (c) Vagina      Assessments 5/6/2024  8:58 AM 5/6/2024  1:11 PM   Site Assessment Dry;Intact --   Closure Approximated --   Treatments Mesh briefs --   Dressing Peripad Peripad;Dermabond   Dressing Changed New --   Dressing Status Clean;Dry;Intact  Clean;Dry;Intact       No associated orders.       Wound 02/03/25 Hip Left (Active)   Date First Assessed/Time First Assessed: 02/03/25 1037   Primary Wound Type: Incision  Location: Hip  Wound Location Orientation: Left      Assessments 2/3/2025 10:37 AM 2/4/2025  8:45 AM   Site Assessment -- Unable to assess (Dressing in place)   Drainage Amount -- None   Treatments Ice Therapy Wrap Ice Therapy Wrap;Compression Sleeve   Dressing Dermabond;Aquacel Aquacel   Dressing Status -- Clean;Dry;Intact       No associated orders.              ASSESSMENT AND PLAN:    There are no diagnoses linked to this encounter.    Discussed with patient ***the aspects of wound healing including:  blood flow in/out ***and managing edema with appropriate compression, wound bed optimization including moist wound healing, removal of necrosis, bioburden control, monitoring for infection, and finally the patient as a whole including nutrition and increased protein intake and blood glucose control.      Risks, benefits, and alternatives of current treatment plan discussed in detail.  Questions and concerns addressed. Red flags to RTC or ED reviewed.  Patient (or parent) agrees to plan.      NOTE TO PATIENT: The 21st Century Cures Act makes clinical notes like these available to patients in the interest of transparency. Clinical notes are medical documents used by physicians and care providers to communicate with each other. These documents include medical language and terminology, abbreviations, and treatment information that may sound technical and at times possibly unfamiliar. In addition, at times, the verbiage may appear blunt or direct. These documents are one tool providers use to communicate relevant information and clinical opinions of the care providers in a way that allows common understanding of the clinical context.   Patient is new to clinic, pcp is duly, sees duly providers.  I spent   ***   minutes with the patient. This time  included:    preparing to see the patient (eg, review notes and recent diagnostics),  seeing the patient, obtaining and/or reviewing separately obtained history, performing a medically appropriate examination and/or evaluation, counseling and educating the patient, documenting in the record   DISCHARGE:    There are no Patient Instructions on file for this visit.   Niya Poe FNP-C, CWCN-AP, CFCN, CSWS, WCC, DWC  3/6/2025            [1] No Known Allergies

## 2025-03-06 NOTE — PROGRESS NOTES
CHIEF COMPLAINT:   No chief complaint on file.    HPI:   Information obtained from ***  3-7-25 Initial:  74 year old female with history of hypertension, hyperlipidemia, pulmonary embolism status post left total hip arthroplasty with Dr. Huerta on 2-3-25.  The dehiscence was noted on 2-28-25, patient has been dressing with ***. Patient is participating in outpatient PT. ***    MEDICATIONS:     Current Outpatient Medications:     aspirin 81 MG Oral Tab EC, Take 1 tablet (81 mg total) by mouth 2 (two) times daily for 28 days. Post op Take 1 tablet (81 mg total) by mouth 2 (two) times daily for 28 days. Begin after completing original 2 weeks of anticoagulation (ie eliquis/xarelto). To take for 4 weeks total., Disp: 56 tablet, Rfl: 0    oxyCODONE 5 MG Oral Tab, Take 0.5-1 tablets (2.5-5 mg total) by mouth every 4 (four) hours as needed for Pain. Post op, Disp: , Rfl:     pregabalin 75 MG Oral Cap, Take 1 capsule (75 mg total) by mouth daily. Post op, Disp: , Rfl:     sennosides-docusate 8.6-50 MG Oral Tab, Take 1 tablet by mouth daily. Post op, Disp: , Rfl:     traMADol 50 MG Oral Tab, Take 1 tablet (50 mg total) by mouth every 4 to 6 hours as needed for Pain.  Post op .Take 1 tablet (50 mg total) by mouth every 4 to 6 hours as needed for Pain (Can alternate with Oxycodone). Do not take along with oxycodone or norco (hydrocodone) - separate by 2 hours if needed, Disp: , Rfl:     albuterol 108 (90 Base) MCG/ACT Inhalation Aero Soln, Inhale 2 puffs into the lungs every 6 (six) hours as needed for Wheezing or Shortness of Breath., Disp: 1 each, Rfl: 0    celecoxib 200 MG Oral Cap, Take 1 capsule (200 mg total) by mouth daily., Disp: , Rfl:     lisinopril-hydroCHLOROthiazide 20-12.5 MG Oral Tab, Take 1 tablet by mouth once daily., Disp: 90 tablet, Rfl: 0    Lovastatin 10 MG Oral Tab, TAKE ONE TABLET BY MOUTH NIGHTLY, Disp: 90 tablet, Rfl: 0    ergocalciferol 1.25 MG (34734 UT) Oral Cap, Take 1 capsule (50,000 Units total)  by mouth once a week., Disp: 6 capsule, Rfl: 0  ALLERGIES:   Allergies[1]   REVIEW OF SYSTEMS:   This information was obtained from the patient/family and chart.    See HPI for pertinent positives, otherwise 10 pt ROS negative.    HISTORY:     Past Medical History:    Essential hypertension    High blood pressure    High cholesterol    Osteoarthritis    Other acute pulmonary embolism without acute cor pulmonale (HCC)    Pulmonary embolism (HCC)     Past Surgical History:   Procedure Laterality Date    Colonoscopy      Other surgical history      Oral tooth extraction surgery     Total abdom hysterectomy        Social History     Socioeconomic History    Marital status:    Tobacco Use    Smoking status: Never     Passive exposure: Never    Smokeless tobacco: Never   Substance and Sexual Activity    Alcohol use: No     Alcohol/week: 0.0 standard drinks of alcohol    Drug use: No     Social Drivers of Health     Food Insecurity: No Food Insecurity (2/3/2025)    Food Insecurity     Food Insecurity: Never true   Transportation Needs: No Transportation Needs (2/3/2025)    Transportation Needs     Lack of Transportation: No   Housing Stability: Low Risk  (2/3/2025)    Housing Stability     Housing Instability: No     PHYSICAL EXAM:   There were no vitals filed for this visit.   Estimated body mass index is 33.76 kg/m² as calculated from the following:    Height as of 2/3/25: 63\".    Weight as of 2/3/25: 190 lb 9.6 oz (86.5 kg).   No results found for: \"PGLU\"    Vital signs reviewed.Appears stated age, well groomed.    Constitutional:  Bp ***wnl for patient. Pulse Regular and wnl for patient. Respirations easy and unlabored. Temperature wnl. Weight ***normal for height. Appearance neat and clean. Appears in no acute distress. Well nourished and well developed.    Respiratory: Respiratory ***effort is easy and symmetric bilaterally. Rate is normal at rest and on ***room air.  Bilateral breath sounds are clear and  equal w/ no wheezes, rales or rhonchi.    Cardiovascular:  Heart rhythm and rate regular, without murmur or gallop. ***Abnormal : irregularly irregular, +murmur    Musculoskeletal:  Gait and station stable ***  Gait independent with assistance of (cane, walker, crutches, knee roller)  Patient is in wheelchair propelled by others, able to transfer with assist  Patient is in a motor scooter/chair, able to transfer with assist  Patient is dependent for all transfers and mobility  Independent with assistance of a wheelchair, able to transfer with slideboard  NWB, pivot transfer with assist  Integumentary:  refer to wound characteristics and images   Psychiatric:  Judgment and insight intact. Alert and oriented times 3. No evidence of depression, anxiety, or agitation. Calm, cooperative, and communicative. ***Appropriate interactions and affect.  DIAGNOSTICS:     Lab Results   Component Value Date    BUN 13 02/04/2025    CREATSERUM 0.63 02/04/2025    GFRCKDEPI 97.63 12/28/2021    ALB 4.2 12/25/2024    TP 6.9 12/25/2024    A1C 5.4 10/13/2021       WOUND ASSESSMENT:     Wound 05/06/24 Abdomen (Active)   Date First Assessed/Time First Assessed: 05/06/24 0855   Primary Wound Type: Incision  Location: Abdomen  Wound Description (Comments): laparoscopic sites times five      Assessments 5/6/2024  8:58 AM 5/6/2024 12:47 PM   Site Assessment Clean;Dry;Intact --   Closure Approximated --   Dressing Dermabond Dermabond   Dressing Changed New --   Dressing Status Clean;Dry;Intact Clean;Dry;Intact       No associated orders.       Wound 05/06/24 Vagina (Active)   Date First Assessed/Time First Assessed: 05/06/24 0858   Primary Wound Type: Other (comment)  Location: (c) Vagina      Assessments 5/6/2024  8:58 AM 5/6/2024  1:11 PM   Site Assessment Dry;Intact --   Closure Approximated --   Treatments Mesh briefs --   Dressing Peripad Peripad;Dermabond   Dressing Changed New --   Dressing Status Clean;Dry;Intact Clean;Dry;Intact        No associated orders.       Wound 02/03/25 Hip Left (Active)   Date First Assessed/Time First Assessed: 02/03/25 1037   Primary Wound Type: Incision  Location: Hip  Wound Location Orientation: Left      Assessments 2/3/2025 10:37 AM 2/4/2025  8:45 AM   Site Assessment -- Unable to assess (Dressing in place)   Drainage Amount -- None   Treatments Ice Therapy Wrap Ice Therapy Wrap;Compression Sleeve   Dressing Dermabond;Aquacel Aquacel   Dressing Status -- Clean;Dry;Intact       No associated orders.              ASSESSMENT AND PLAN:    There are no diagnoses linked to this encounter.    Discussed with patient ***the aspects of wound healing including:  blood flow in/out ***and managing edema with appropriate compression, wound bed optimization including moist wound healing, removal of necrosis, bioburden control, monitoring for infection, and finally the patient as a whole including nutrition and increased protein intake and blood glucose control.      Risks, benefits, and alternatives of current treatment plan discussed in detail.  Questions and concerns addressed. Red flags to RTC or ED reviewed.  Patient (or parent) agrees to plan.      NOTE TO PATIENT: The 21st Century Cures Act makes clinical notes like these available to patients in the interest of transparency. Clinical notes are medical documents used by physicians and care providers to communicate with each other. These documents include medical language and terminology, abbreviations, and treatment information that may sound technical and at times possibly unfamiliar. In addition, at times, the verbiage may appear blunt or direct. These documents are one tool providers use to communicate relevant information and clinical opinions of the care providers in a way that allows common understanding of the clinical context.   Patient is new to clinic, pcp is duly, sees duly providers.  I spent   ***   minutes with the patient. This time included:    preparing to  see the patient (eg, review notes and recent diagnostics),  seeing the patient, obtaining and/or reviewing separately obtained history, performing a medically appropriate examination and/or evaluation, counseling and educating the patient, documenting in the record   DISCHARGE:    There are no Patient Instructions on file for this visit.   Niya Poe FNP-C, CWCN-AP, CFCN, CSWS, WCC, DWC  3/7/2025          [1] No Known Allergies

## 2025-03-07 ENCOUNTER — TELEPHONE (OUTPATIENT)
Dept: WOUND CARE | Facility: HOSPITAL | Age: 75
End: 2025-03-07

## 2025-03-07 ENCOUNTER — APPOINTMENT (OUTPATIENT)
Dept: WOUND CARE | Facility: HOSPITAL | Age: 75
End: 2025-03-07
Attending: NURSE PRACTITIONER
Payer: COMMERCIAL

## 2025-03-07 NOTE — TELEPHONE ENCOUNTER
States to cancel appt, not able to obtain referrals from PCP. Andrey Hair to call us back when she obtain referrals.

## 2025-03-19 ENCOUNTER — OFFICE VISIT (OUTPATIENT)
Dept: WOUND CARE | Facility: HOSPITAL | Age: 75
End: 2025-03-19
Attending: NURSE PRACTITIONER
Payer: MEDICARE

## 2025-03-19 VITALS
WEIGHT: 192 LBS | HEIGHT: 63 IN | TEMPERATURE: 98 F | DIASTOLIC BLOOD PRESSURE: 44 MMHG | BODY MASS INDEX: 34.02 KG/M2 | RESPIRATION RATE: 16 BRPM | SYSTOLIC BLOOD PRESSURE: 102 MMHG | HEART RATE: 73 BPM

## 2025-03-19 DIAGNOSIS — Z96.642 STATUS POST HIP REPLACEMENT, LEFT: Primary | ICD-10-CM

## 2025-03-19 DIAGNOSIS — T81.31XD DISRUPTION OF EXTERNAL OPERATION (SURGICAL) WOUND, NOT ELSEWHERE CLASSIFIED, SUBSEQUENT ENCOUNTER: ICD-10-CM

## 2025-03-19 DIAGNOSIS — Z96.9 PRESENCE OF RETAINED HARDWARE: ICD-10-CM

## 2025-03-19 PROCEDURE — 99214 OFFICE O/P EST MOD 30 MIN: CPT | Performed by: NURSE PRACTITIONER

## 2025-03-19 RX ORDER — GENTAMICIN SULFATE 1 MG/G
1 OINTMENT TOPICAL 2 TIMES DAILY
Qty: 30 G | Refills: 0 | Status: SHIPPED | OUTPATIENT
Start: 2025-03-19 | End: 2025-04-02

## 2025-03-19 NOTE — PROGRESS NOTES
CHIEF COMPLAINT:     Chief Complaint   Patient presents with    Wound Care     Patient is here for an initial consult. She presents with a wound on her left hip from surgery on 2/3/25. The wound opened on 2/17/25. She has completed a course of antibiotics for the wound. She has been doing wet to dry twice daily with iodoform packing strips. She denies any pain to the wound.      HPI:   Information obtained from patient, chart  3-19-25Initial:  74 year old female with history of hypertension, hyperlipidemia, pulmonary embolism status post left total hip arthroplasty with Dr. Huerta on 2-3-25.  The patient presents with daughter and there is another daughter (who is a nurse) on the phone. The dehiscence was noted on 2-28-25, patient has been dressing with iodoform packing strip. Patient is participating in outpatient PT. she previously had an appointment 2 weeks ago however due to insurance issues was unable to come.  Patient lives with her spouse, but daughters live close by. Palpation and manipulation of the wound cause the patient pain.  Patient has had one course of keflex.  Will culture the wound and start patient on topical gentamicin and treat orally based on sensitivities. We also discussed light compression shorts to help support the wound and tissue surrounding the wound.     MEDICATIONS:     Current Outpatient Medications:     gentamicin 0.1 % External Ointment, Apply 1 Application topically in the morning and 1 Application before bedtime. Do all this for 14 days., Disp: 30 g, Rfl: 0    oxyCODONE 5 MG Oral Tab, Take 0.5-1 tablets (2.5-5 mg total) by mouth every 4 (four) hours as needed for Pain. Post op, Disp: , Rfl:     traMADol 50 MG Oral Tab, Take 1 tablet (50 mg total) by mouth every 4 to 6 hours as needed for Pain.  Post op .Take 1 tablet (50 mg total) by mouth every 4 to 6 hours as needed for Pain (Can alternate with Oxycodone). Do not take along with oxycodone or norco (hydrocodone) - separate by 2  hours if needed, Disp: , Rfl:     lisinopril-hydroCHLOROthiazide 20-12.5 MG Oral Tab, Take 1 tablet by mouth once daily., Disp: 90 tablet, Rfl: 0    Lovastatin 10 MG Oral Tab, TAKE ONE TABLET BY MOUTH NIGHTLY, Disp: 90 tablet, Rfl: 0    ergocalciferol 1.25 MG (63875 UT) Oral Cap, Take 1 capsule (50,000 Units total) by mouth once a week., Disp: 6 capsule, Rfl: 0    pregabalin 75 MG Oral Cap, Take 1 capsule (75 mg total) by mouth daily. Post op (Patient not taking: Reported on 3/19/2025), Disp: , Rfl:     sennosides-docusate 8.6-50 MG Oral Tab, Take 1 tablet by mouth daily. Post op (Patient not taking: Reported on 3/19/2025), Disp: , Rfl:     albuterol 108 (90 Base) MCG/ACT Inhalation Aero Soln, Inhale 2 puffs into the lungs every 6 (six) hours as needed for Wheezing or Shortness of Breath. (Patient not taking: Reported on 3/19/2025), Disp: 1 each, Rfl: 0    celecoxib 200 MG Oral Cap, Take 1 capsule (200 mg total) by mouth daily. (Patient not taking: Reported on 3/19/2025), Disp: , Rfl:   ALLERGIES:   Allergies[1]   REVIEW OF SYSTEMS:   This information was obtained from the patient/family and chart.    See HPI for pertinent positives, otherwise 10 pt ROS negative.    HISTORY:     Past Medical History:    Essential hypertension    High blood pressure    High cholesterol    Osteoarthritis    Other acute pulmonary embolism without acute cor pulmonale (HCC)    Pulmonary embolism (HCC)     Past Surgical History:   Procedure Laterality Date    Colonoscopy      Other surgical history      Oral tooth extraction surgery     Total abdom hysterectomy        Social History     Socioeconomic History    Marital status:    Tobacco Use    Smoking status: Never     Passive exposure: Never    Smokeless tobacco: Never   Substance and Sexual Activity    Alcohol use: No     Alcohol/week: 0.0 standard drinks of alcohol    Drug use: No     Social Drivers of Health     Food Insecurity: No Food Insecurity (2/3/2025)    Food Insecurity      Food Insecurity: Never true   Transportation Needs: No Transportation Needs (2/3/2025)    Transportation Needs     Lack of Transportation: No   Housing Stability: Low Risk  (2/3/2025)    Housing Stability     Housing Instability: No     PHYSICAL EXAM:     Vitals:    03/19/25 1538   BP: 102/44   Pulse: 73   Resp: 16   Temp: 98 °F (36.7 °C)   Weight: 192 lb (87.1 kg)   Height: 63\"      Estimated body mass index is 34.01 kg/m² as calculated from the following:    Height as of this encounter: 63\".    Weight as of this encounter: 192 lb (87.1 kg).   No results found for: \"PGLU\"    Vital signs reviewed.Appears stated age, well groomed.    Constitutional:  Bp wnl for patient. Pulse Regular and wnl for patient. Respirations easy and unlabored. Temperature wnl. obese. Appearance neat and clean. Appears in no acute distress. Well nourished and well developed.  Cardiovascular:  Heart rhythm and rate regular, without murmur or gallop.     Musculoskeletal:  Gait and station stable cane  Integumentary:  refer to wound characteristics and images   Psychiatric:  Judgment and insight intact. Alert and oriented times 3. No evidence of depression, anxiety, or agitation. Calm, cooperative, and communicative.   DIAGNOSTICS:     Lab Results   Component Value Date    BUN 13 02/04/2025    CREATSERUM 0.63 02/04/2025    GFRCKDEPI 97.63 12/28/2021    ALB 4.2 12/25/2024    TP 6.9 12/25/2024    A1C 5.4 10/13/2021       WOUND ASSESSMENT:     Wound 05/06/24 Abdomen (Active)   Date First Assessed/Time First Assessed: 05/06/24 0855   Primary Wound Type: Incision  Location: Abdomen  Wound Description (Comments): laparoscopic sites times five      Assessments 5/6/2024  8:58 AM 5/6/2024 12:47 PM   Site Assessment Clean;Dry;Intact --   Closure Approximated --   Dressing Dermabond Dermabond   Dressing Changed New --   Dressing Status Clean;Dry;Intact Clean;Dry;Intact       No associated orders.       Wound 05/06/24 Vagina (Active)   Date First  Assessed/Time First Assessed: 05/06/24 0858   Primary Wound Type: Other (comment)  Location: (c) Vagina      Assessments 5/6/2024  8:58 AM 5/6/2024  1:11 PM   Site Assessment Dry;Intact --   Closure Approximated --   Treatments Mesh briefs --   Dressing Peripad Peripad;Dermabond   Dressing Changed New --   Dressing Status Clean;Dry;Intact Clean;Dry;Intact       No associated orders.       Wound 03/19/25 #1 Left hip Hip Left (Active)   Date First Assessed/Time First Assessed: 03/19/25 1530    Wound Number (Wound Clinic Only): #1 Left hip  Primary Wound Type: Old surgical  Location: Hip  Wound Location Orientation: Left      Assessments 3/19/2025  3:31 PM   Wound Image     Drainage Amount Small   Drainage Description Serosanguineous   Wound Length (cm) 1.4 cm   Wound Width (cm) 0.2 cm   Wound Surface Area (cm^2) 0.28 cm^2   Wound Depth (cm) 0.7 cm   Wound Volume (cm^3) 0.196 cm^3   Margins Well-defined edges   Non-staged Wound Description Full thickness   April-wound Assessment Edema;Induration   Wound Granulation Tissue Firm;Pink   Wound Bed Granulation (%) 95 %   Wound Bed Slough (%) 5 %   Wound Odor None   Tunneling? No   Undermining? No   Sinus Tracts? No       No associated orders.              ASSESSMENT AND PLAN:      1. Status post hip replacement, left  - Aerobic Bacterial Culture    2. Disruption of external operation (surgical) wound, not elsewhere classified, subsequent encounter  - Aerobic Bacterial Culture    3. Presence of retained hardware        Discussed with patient and daughters the aspects of wound healing including:  managing edema and movement in the wound bed with compression shorts, wound bed optimization including moist wound healing, removal of necrosis, bioburden control, monitoring for infection, and finally the patient as a whole including nutrition and increased protein intake      Risks, benefits, and alternatives of current treatment plan discussed in detail.  Questions and concerns  addressed. Red flags to RTC or ED reviewed.  Patient (or parent) agrees to plan.      NOTE TO PATIENT: The  Cures Act makes clinical notes like these available to patients in the interest of transparency. Clinical notes are medical documents used by physicians and care providers to communicate with each other. These documents include medical language and terminology, abbreviations, and treatment information that may sound technical and at times possibly unfamiliar. In addition, at times, the verbiage may appear blunt or direct. These documents are one tool providers use to communicate relevant information and clinical opinions of the care providers in a way that allows common understanding of the clinical context.   Patient is new to clinic, pcp is duly, sees duly providers.  I spent   45   minutes with the patient. This time included:    preparing to see the patient (eg, review notes and recent diagnostics),  seeing the patient, obtaining and/or reviewing separately obtained history, performing a medically appropriate examination and/or evaluation, counseling and educating the patient, documenting in the record, cx, rx  DISCHARGE:      Patient Instructions   Please return: 1.5 weeks (any day of the week)    Things to do:  Laboratory (blood draw) orders:  Orders Placed This Encounter   Procedures    Aerobic Bacterial Culture       Meds & Refills for this Visit:  Requested Prescriptions     Signed Prescriptions Disp Refills    gentamicin 0.1 % External Ointment 30 g 0     Sig: Apply 1 Application topically in the morning and 1 Application before bedtime. Do all this for 14 days.     Patient discharge and wound care instructions  Dolores Finn  3/19/2025     You may shower with protection of the wound (ie a cast cover or similar).     Changing your dressin-2x a day    Wash your hands with soap and water.  Ensure that the old dressing is removed completely. Place it in a plastic bag and  throw it in the trash.  Cleanse the wound with hypochlorous wound cleanser (ie. Anasept, vashe, pure and clean) .  It's ok to “scrub” your wound with the gauze, small amount of bleeding with cleansing is normal and ok.  Apply the following dressings:  Gentamicin and cover with gauze/bandaid    Wear light compression shorts/undergarments    Nutrition and blood sugar control:  Focus on the following:  Protein: Meats, beans, eggs, milk and yogurt particularly Greek yogurt), tofu, soy nuts, soy protein products (Follow the protein handout in your welcome folder)  Vitamin C: Citrus fruits and juices, strawberries, tomatoes, tomato juice, peppers, baked potatoes, spinach, broccoli, cauliflower, [place] sprouts, cabbage  Vitamin A: Dark green, leafy vegetables, orange or yellow vegetables, cantaloupe, fortified dairy products, liver, fortified cereals  Zinc: Fortified cereals, red meats, seafood  Consider supplementing with Fazal by [employer]. It can be purchased on amazon, [employer] website, or local pharmacy may be able to order it for you.  (These are essential branch chain amino acids that help with tissue building and wound healing).     Concerns:  Signs of infection may include the following:  Increase in redness  Red \"streaks\" from wound  Increase in swelling  Fever  Unusual odor  Change in the amount of wound drainage     Should you experience any significant changes in your wound(s) or have any questions regarding your home care instructions please contact the [facility] @ [phone] If after regular business hours, please call your family doctor or local emergency room. The treatment plan has been discussed at length between you and your provider. Follow all instructions carefully, it is very important. If you do not follow all instructions you are at risk of your wound not healing, infection, possible loss of limb and even loss of life.    Niya Poe FNP-C, CWCN-AP, CFCN, CSWS, WCC,  SANDRA  3/19/2025          [1] No Known Allergies

## 2025-03-19 NOTE — PROGRESS NOTES
.Weekly Wound Education Note    Teaching Provided To: Patient, Family  Training Topics: Discharge instructions, Dressing, Cleasing and general instructions, Compression, Test/procedures  Training Method: Explain/Verbal, Written  Training Response: Patient responds and understands, Reinforcement needed            Wound cultured this visit.  Gentamicin ointment prescribed, patient to put into wound twice daily and cover with dry dressing.  Patient and daughter encouraged to purchase compression undergarments or shorts to help support wound.

## 2025-03-19 NOTE — PATIENT INSTRUCTIONS
Please return: 1.5 weeks (any day of the week)    Things to do:  Laboratory (blood draw) orders:  Orders Placed This Encounter   Procedures    Aerobic Bacterial Culture       Meds & Refills for this Visit:  Requested Prescriptions     Signed Prescriptions Disp Refills    gentamicin 0.1 % External Ointment 30 g 0     Sig: Apply 1 Application topically in the morning and 1 Application before bedtime. Do all this for 14 days.     Patient discharge and wound care instructions  Dolores Finn  3/19/2025     You may shower with protection of the wound (ie a cast cover or similar).     Changing your dressin-2x a day    Wash your hands with soap and water.  Ensure that the old dressing is removed completely. Place it in a plastic bag and throw it in the trash.  Cleanse the wound with hypochlorous wound cleanser (ie. Anasept, vashe, pure and clean) .  It's ok to “scrub” your wound with the gauze, small amount of bleeding with cleansing is normal and ok.  Apply the following dressings:  Gentamicin and cover with gauze/bandaid    Wear light compression shorts/undergarments    Nutrition and blood sugar control:  Focus on the following:  Protein: Meats, beans, eggs, milk and yogurt particularly Greek yogurt), tofu, soy nuts, soy protein products (Follow the protein handout in your welcome folder)  Vitamin C: Citrus fruits and juices, strawberries, tomatoes, tomato juice, peppers, baked potatoes, spinach, broccoli, cauliflower, [place] sprouts, cabbage  Vitamin A: Dark green, leafy vegetables, orange or yellow vegetables, cantaloupe, fortified dairy products, liver, fortified cereals  Zinc: Fortified cereals, red meats, seafood  Consider supplementing with Fazal by [employer]. It can be purchased on amazon, [employer] website, or local pharmacy may be able to order it for you.  (These are essential branch chain amino acids that help with tissue building and wound healing).     Concerns:  Signs of infection may  include the following:  Increase in redness  Red \"streaks\" from wound  Increase in swelling  Fever  Unusual odor  Change in the amount of wound drainage     Should you experience any significant changes in your wound(s) or have any questions regarding your home care instructions please contact the [facility] @ [phone] If after regular business hours, please call your family doctor or local emergency room. The treatment plan has been discussed at length between you and your provider. Follow all instructions carefully, it is very important. If you do not follow all instructions you are at risk of your wound not healing, infection, possible loss of limb and even loss of life.

## 2025-03-21 RX ORDER — CEPHALEXIN 500 MG/1
500 CAPSULE ORAL 3 TIMES DAILY
Qty: 21 CAPSULE | Refills: 0 | Status: SHIPPED | OUTPATIENT
Start: 2025-03-21 | End: 2025-03-28

## 2025-04-02 ENCOUNTER — OFFICE VISIT (OUTPATIENT)
Dept: WOUND CARE | Facility: HOSPITAL | Age: 75
End: 2025-04-02
Attending: NURSE PRACTITIONER
Payer: MEDICARE

## 2025-04-02 VITALS
TEMPERATURE: 98 F | HEART RATE: 72 BPM | DIASTOLIC BLOOD PRESSURE: 74 MMHG | SYSTOLIC BLOOD PRESSURE: 141 MMHG | RESPIRATION RATE: 14 BRPM

## 2025-04-02 DIAGNOSIS — Z96.9 PRESENCE OF RETAINED HARDWARE: ICD-10-CM

## 2025-04-02 DIAGNOSIS — T81.31XD DISRUPTION OF EXTERNAL OPERATION (SURGICAL) WOUND, NOT ELSEWHERE CLASSIFIED, SUBSEQUENT ENCOUNTER: ICD-10-CM

## 2025-04-02 DIAGNOSIS — B96.4 PROTEUS (MIRABILIS) (MORGANII) AS THE CAUSE OF DISEASES CLASSIFIED ELSEWHERE: ICD-10-CM

## 2025-04-02 DIAGNOSIS — Z96.642 STATUS POST HIP REPLACEMENT, LEFT: Primary | ICD-10-CM

## 2025-04-02 PROCEDURE — 99214 OFFICE O/P EST MOD 30 MIN: CPT | Performed by: NURSE PRACTITIONER

## 2025-04-02 NOTE — PROGRESS NOTES
CHIEF COMPLAINT:     Chief Complaint   Patient presents with    Wound Care     Follow up for left hip wound. No complaints at this time. Has been dressing with gentamicin and foam.      HPI:   Information obtained from patient, chart  3-19-25Initial:  74 year old female with history of hypertension, hyperlipidemia, pulmonary embolism status post left total hip arthroplasty with Dr. Huerta on 2-3-25.  The patient presents with daughter and there is another daughter (who is a nurse) on the phone. The dehiscence was noted on 2-28-25, patient has been dressing with iodoform packing strip. Patient is participating in outpatient PT. she previously had an appointment 2 weeks ago however due to insurance issues was unable to come.  Patient lives with her spouse, but daughters live close by. Palpation and manipulation of the wound cause the patient pain.  Patient has had one course of keflex.  Will culture the wound and start patient on topical gentamicin and treat orally based on sensitivities. We also discussed light compression shorts to help support the wound and tissue surrounding the wound.     4-2-25 patient returns. Culture was positive for proteus.  Patient was treating with topical gent and I rx'dm, 7 days of cephalexin. Patient is continuing with physical therapy.  Wound pain no longer present. Wound is essentially resolved. We discussed that will dress with coloplast paste, patient has a f/u with dr. Briggs this Friday. Patient does not need to return to clinic unless there is concerns.     MEDICATIONS:     Current Outpatient Medications:     gentamicin 0.1 % External Ointment, Apply 1 Application topically in the morning and 1 Application before bedtime. Do all this for 14 days., Disp: 30 g, Rfl: 0    oxyCODONE 5 MG Oral Tab, Take 0.5-1 tablets (2.5-5 mg total) by mouth every 4 (four) hours as needed for Pain. Post op, Disp: , Rfl:     pregabalin 75 MG Oral Cap, Take 1 capsule (75 mg total) by mouth daily.  Post op (Patient not taking: Reported on 3/19/2025), Disp: , Rfl:     sennosides-docusate 8.6-50 MG Oral Tab, Take 1 tablet by mouth daily. Post op (Patient not taking: Reported on 3/19/2025), Disp: , Rfl:     traMADol 50 MG Oral Tab, Take 1 tablet (50 mg total) by mouth every 4 to 6 hours as needed for Pain.  Post op .Take 1 tablet (50 mg total) by mouth every 4 to 6 hours as needed for Pain (Can alternate with Oxycodone). Do not take along with oxycodone or norco (hydrocodone) - separate by 2 hours if needed, Disp: , Rfl:     albuterol 108 (90 Base) MCG/ACT Inhalation Aero Soln, Inhale 2 puffs into the lungs every 6 (six) hours as needed for Wheezing or Shortness of Breath. (Patient not taking: Reported on 3/19/2025), Disp: 1 each, Rfl: 0    celecoxib 200 MG Oral Cap, Take 1 capsule (200 mg total) by mouth daily. (Patient not taking: Reported on 3/19/2025), Disp: , Rfl:     lisinopril-hydroCHLOROthiazide 20-12.5 MG Oral Tab, Take 1 tablet by mouth once daily., Disp: 90 tablet, Rfl: 0    Lovastatin 10 MG Oral Tab, TAKE ONE TABLET BY MOUTH NIGHTLY, Disp: 90 tablet, Rfl: 0    ergocalciferol 1.25 MG (39887 UT) Oral Cap, Take 1 capsule (50,000 Units total) by mouth once a week., Disp: 6 capsule, Rfl: 0  ALLERGIES:   Allergies[1]   REVIEW OF SYSTEMS:   This information was obtained from the patient/family and chart.    See HPI for pertinent positives, otherwise 10 pt ROS negative.  HISTORY:   Past medical, surgical, family and social history updated where appropriate.    PHYSICAL EXAM:     Vitals:    04/02/25 1100   BP: 141/74   Pulse: 72   Resp: 14   Temp: 98 °F (36.7 °C)        Estimated body mass index is 34.01 kg/m² as calculated from the following:    Height as of 3/19/25: 63\".    Weight as of 3/19/25: 192 lb (87.1 kg).   No results found for: \"PGLU\"    Vital signs reviewed.Appears stated age, well groomed.    Constitutional:  Bp wnl for patient. Pulse Regular and wnl for patient. Respirations easy and unlabored.  Temperature wnl. obese. Appearance neat and clean. Appears in no acute distress. Well nourished and well developed.    Musculoskeletal:  Gait and station stable cane  Integumentary:  refer to wound characteristics and images   Psychiatric:  Judgment and insight intact. Alert and oriented times 3. No evidence of depression, anxiety, or agitation. Calm, cooperative, and communicative.   DIAGNOSTICS:     Lab Results   Component Value Date    BUN 13 02/04/2025    CREATSERUM 0.63 02/04/2025    GFRCKDEPI 97.63 12/28/2021    ALB 4.2 12/25/2024    TP 6.9 12/25/2024    A1C 5.4 10/13/2021       WOUND ASSESSMENT:     Wound 05/06/24 Abdomen (Active)   Date First Assessed/Time First Assessed: 05/06/24 0855   Primary Wound Type: Incision  Location: Abdomen  Wound Description (Comments): laparoscopic sites times five      Assessments 5/6/2024  8:58 AM 5/6/2024 12:47 PM   Site Assessment Clean;Dry;Intact --   Closure Approximated --   Dressing Dermabond Dermabond   Dressing Changed New --   Dressing Status Clean;Dry;Intact Clean;Dry;Intact       No associated orders.       Wound 05/06/24 Vagina (Active)   Date First Assessed/Time First Assessed: 05/06/24 0858   Primary Wound Type: Other (comment)  Location: (c) Vagina      Assessments 5/6/2024  8:58 AM 5/6/2024  1:11 PM   Site Assessment Dry;Intact --   Closure Approximated --   Treatments Mesh briefs --   Dressing Peripad Peripad;Dermabond   Dressing Changed New --   Dressing Status Clean;Dry;Intact Clean;Dry;Intact       No associated orders.       Wound 03/19/25 #1 Left hip Hip Left (Active)   Date First Assessed/Time First Assessed: 03/19/25 1530    Wound Number (Wound Clinic Only): #1 Left hip  Primary Wound Type: Old surgical  Location: Hip  Wound Location Orientation: Left      Assessments 3/19/2025  3:31 PM 4/2/2025  2:12 PM   Wound Image       Drainage Amount Small None   Drainage Description Serosanguineous --   Wound Length (cm) 1.4 cm 0 cm   Wound Width (cm) 0.2 cm 0 cm    Wound Surface Area (cm^2) 0.28 cm^2 0 cm^2   Wound Depth (cm) 0.7 cm 0 cm   Wound Volume (cm^3) 0.196 cm^3 0 cm^3   Wound Healing % -- 100   Margins Well-defined edges Well-defined edges   Non-staged Wound Description Full thickness Full thickness   April-wound Assessment Edema;Induration Intact   Wound Granulation Tissue Firm;Pink --   Wound Bed Granulation (%) 95 % --   Wound Bed Epithelium (%) -- 100 %   Wound Bed Slough (%) 5 % --   Wound Odor None None   Tunneling? No --   Undermining? No --   Sinus Tracts? No --       No associated orders.              ASSESSMENT AND PLAN:      1. Status post hip replacement, left    2. Disruption of external operation (surgical) wound, not elsewhere classified, subsequent encounter    3. Presence of retained hardware    4. Proteus (mirabilis) (morganii) as the cause of diseases classified elsewhere          Risks, benefits, and alternatives of current treatment plan discussed in detail.  Questions and concerns addressed. Red flags to RTC or ED reviewed.  Patient (or parent) agrees to plan.      NOTE TO PATIENT: The 21st Century Cures Act makes clinical notes like these available to patients in the interest of transparency. Clinical notes are medical documents used by physicians and care providers to communicate with each other. These documents include medical language and terminology, abbreviations, and treatment information that may sound technical and at times possibly unfamiliar. In addition, at times, the verbiage may appear blunt or direct. These documents are one tool providers use to communicate relevant information and clinical opinions of the care providers in a way that allows common understanding of the clinical context.    I spent   30 minutes with the patient. This time included:    preparing to see the patient (eg, review notes and recent diagnostics),  seeing the patient, obtaining and/or reviewing separately obtained history, performing a medically  appropriate examination and/or evaluation, counseling and educating the patient, documenting in the record, communication with ortho  DISCHARGE:      Patient Instructions   Patient discharge and wound care instructions  Dolores Finn  4/2/2025     You may shower and cleanse area with mild soap and water, rinse wound with saline or wound cleanser, dab dry with gauze and apply coloplast triad hydrophillic paste.    Return as needed       Niya Poe FNP-C, CWCN-AP, CFCN, CSWS, WCC, DWC  4/2/2025          [1] No Known Allergies

## 2025-04-02 NOTE — PROGRESS NOTES
.Weekly Wound Education Note    Teaching Provided To: Patient, Family  Training Topics: Discharge instructions, Dressing, Compression, Cleasing and general instructions  Training Method: Written, Explain/Verbal  Training Response: Patient responds and understands, Reinforcement needed            Patient and family instructed to stop using Gentamicin.  Sample of Coloplast TRIAD paste provided, will apply daily and cover with dry dressing.  Patient will follow up if needed.

## 2025-04-02 NOTE — PATIENT INSTRUCTIONS
Patient discharge and wound care instructions  Dolores Finn  4/2/2025     You may shower and cleanse area with mild soap and water, rinse wound with saline or wound cleanser, dab dry with gauze and apply coloplast triad hydrophillic paste.    Return as needed

## (undated) DEVICE — SOLUTION IRRIG 3000ML 0.9% NACL FLX CONT

## (undated) DEVICE — AIRSEAL 8 MM ACCESS PORT AND LOW PROFILE OBTURATOR WITH BLADELESS OPTICAL TIP, 120 MM LENGTH: Brand: AIRSEAL

## (undated) DEVICE — SUT COAT VCRL 0 27IN CP-1 ABSRB UD 36MM 1/2

## (undated) DEVICE — SUT COAT VCRL 0 27IN CT-1 ABSRB VLT 36MM 1/2

## (undated) DEVICE — SHEET,DRAPE,70X100,STERILE: Brand: MEDLINE

## (undated) DEVICE — HOOD: Brand: FLYTE

## (undated) DEVICE — GLOVE SUR 7 SENSICARE PI PIP GRN PWD F

## (undated) DEVICE — BIPOLAR GRASPER, LONG: Brand: ENDOWRIST

## (undated) DEVICE — MEGA SUTURECUT ND: Brand: ENDOWRIST

## (undated) DEVICE — SUT FBRWR 2 38IN N ABSRB BLU L26.5MM 1/2

## (undated) DEVICE — REAMER BLADE PATELLA 29

## (undated) DEVICE — ANTERIOR HIP: Brand: MEDLINE INDUSTRIES, INC.

## (undated) DEVICE — GLOVE SUR 7.5 SENSICARE PI PIP GRN PWD F

## (undated) DEVICE — SUTURE VICRYL 2-0 CP-1

## (undated) DEVICE — SCD SLEEVE KNEE HI BLEND

## (undated) DEVICE — ANCHOR TISSUE RETRIEVAL SYSTEM, BAG SIZE 125 ML, PORT SIZE 8 MM: Brand: ANCHOR TISSUE RETRIEVAL SYSTEM

## (undated) DEVICE — 3M™ STERI-DRAPE™ INSTRUMENT POUCH 1018: Brand: STERI-DRAPE™

## (undated) DEVICE — DRAPE,U/SHT,SPLIT,FILM,60X84,STERILE: Brand: MEDLINE

## (undated) DEVICE — SUT STRATAFIX SPRL MCRYL+ 2-0 27IN CT-1 ABSRB

## (undated) DEVICE — HOOD, PEEL-AWAY: Brand: FLYTE

## (undated) DEVICE — SUT MCRYL 4-0 18IN PS-2 ABSRB UD 19MM 3/8 CIR

## (undated) DEVICE — NEPTUNE E-SEP SMOKE EVACUATION PENCIL, COATED, 70MM BLADE, PUSH BUTTON SWITCH: Brand: NEPTUNE E-SEP

## (undated) DEVICE — NDLCTR: FOAM/ADHESIVE 10CT 96/CS: Brand: MEDICAL ACTION INDUSTRIES

## (undated) DEVICE — 20 ML SYRINGE LUER-LOCK TIP: Brand: MONOJECT

## (undated) DEVICE — GLOVE SUR 6.5 SENSICARE PI PIP CRM PWD F

## (undated) DEVICE — STRYKER PERFORMANCE SERIES SAGITTAL BLADE: Brand: STRYKER PERFORMANCE SERIES

## (undated) DEVICE — INTENDED FOR TISSUE SEPARATION, AND OTHER PROCEDURES THAT REQUIRE A SHARP SURGICAL BLADE TO PUNCTURE OR CUT.: Brand: BARD-PARKER ® STAINLESS STEEL BLADES

## (undated) DEVICE — GLOVE SUR 7 SENSICARE PI PIP CRM PWD F

## (undated) DEVICE — ELECTRODE ES RET 2 PATE W/ 10FT CRD MPLR DISP

## (undated) DEVICE — 1016 S-DRAPE IRRIG POUCH 10/BOX: Brand: STERI-DRAPE™

## (undated) DEVICE — SPONGE 4X4 10PK

## (undated) DEVICE — TENACULUM FORCEPS: Brand: ENDOWRIST

## (undated) DEVICE — SUT STRATAFIX SYMMTRC PDS+ 1 18IN CTX ABSRB V

## (undated) DEVICE — TOTAL KNEE CDS: Brand: MEDLINE INDUSTRIES, INC.

## (undated) DEVICE — TRAY CATH FOLEY 16FR INCLUDE BARDX IC COMPLT CARE

## (undated) DEVICE — ANTISEPTIC 4OZ 70% ISO ALC

## (undated) DEVICE — POWDER HEMSTAT 3GM OXIDIZED REGENERATED CELOS

## (undated) DEVICE — SUT PDS II 2-0 27IN ABSRB VLT L26MM CT-2

## (undated) DEVICE — SET IRRIG L96IN POST OP W/ NVENT 2ND PIERCING

## (undated) DEVICE — GAUZE SPONGES,12 PLY: Brand: CURITY

## (undated) DEVICE — APPLICATOR ENDOSCP FOR ADJUNCTIVE HEMSTAS

## (undated) DEVICE — SLEEVE COMPR MD KNEE LEN SGL USE KENDALL SCD

## (undated) DEVICE — SOLUTION IRRIG 1000ML 0.9% NACL USP BTL

## (undated) DEVICE — Device: Brand: POWER-FLO®

## (undated) DEVICE — BIT DRL 3.2X30MM HIP DISP FOR 2

## (undated) DEVICE — CONVERTORS STOCKINETTE: Brand: CONVERTORS

## (undated) DEVICE — STERILE POLYISOPRENE POWDER-FREE SURGICAL GLOVES: Brand: PROTEXIS

## (undated) DEVICE — CEMENT MIXING SYSTEM WITH FEMORAL BREAKWAY NOZZLE: Brand: REVOLUTION

## (undated) DEVICE — GLOVE SUR 8 SENSICARE PI PIP CRM PWD F

## (undated) DEVICE — CANNULA SEAL

## (undated) DEVICE — SYRINGE 30ML LL TIP

## (undated) DEVICE — WRAP COOLING KNEE W/ICE PILLOW

## (undated) DEVICE — GAMMEX® PI HYBRID SIZE 6, STERILE POWDER-FREE SURGICAL GLOVE, POLYISOPRENE AND NEOPRENE BLEND: Brand: GAMMEX

## (undated) DEVICE — Device

## (undated) DEVICE — ALCOHOL 70% 4 OZ

## (undated) DEVICE — ROBOTIC: Brand: MEDLINE INDUSTRIES, INC.

## (undated) DEVICE — SOLUTION IV 1000ML DIL ST H2O

## (undated) DEVICE — ADHESIVE SKIN TOP FOR WND CLSR DERMBND ADV

## (undated) DEVICE — STERILE SYNTHETIC POLYISOPRENE POWDER-FREE SURGICAL GLOVES WITH HYDROGEL COATING, SMOOTH FINISH, STRAIGHT FINGER: Brand: PROTEXIS

## (undated) DEVICE — MONOPOLAR CURVED SCISSORS: Brand: ENDOWRIST

## (undated) DEVICE — SOL  .9 3000ML

## (undated) DEVICE — JELLY,LUBE,STERILE,FLIP TOP,TUBE,2-OZ: Brand: MEDLINE

## (undated) DEVICE — PSNA CM FM/CM TIB/VE: Type: IMPLANTABLE DEVICE

## (undated) DEVICE — UNIVERSAL STERIBUMP® STERILE (5/CASE): Brand: UNIVERSAL STERIBUMP®

## (undated) DEVICE — SPECIMEN CONTAINER,POSITIVE SEAL INDICATOR, OR PACKAGED: Brand: PRECISION

## (undated) DEVICE — TIP COVER ACCESSORY

## (undated) DEVICE — SUT PROL 0 30IN CT-1 NABSRB BLU L36MM 1/2 CIR

## (undated) DEVICE — ELECTRODE ES L16.5CM BLDE MPLR OPN APPRCH EZ

## (undated) DEVICE — COLUMN DRAPE

## (undated) DEVICE — ANTIBACTERIAL UNDYED BRAIDED (POLYGLACTIN 910), SYNTHETIC ABSORBABLE SUTURE: Brand: COATED VICRYL

## (undated) DEVICE — SUT MCRYL 3-0 27IN ABSRB UD 19MM PS-2 3/8

## (undated) DEVICE — 3M™ IOBAN™ 2 ANTIMICROBIAL INCISE DRAPE 6650EZ: Brand: IOBAN™ 2

## (undated) DEVICE — DRAPE,TOP,102X53,STERILE: Brand: MEDLINE

## (undated) DEVICE — SLEEVE PROTCT SUR 1 SZ ST SMS EVOL 4

## (undated) DEVICE — HOOK RETRCT L5MM E SHRP SELF RET SYS LONE

## (undated) DEVICE — INSUFFLATION NEEDLE TO ESTABLISH PNEUMOPERITONEUM.: Brand: INSUFFLATION NEEDLE

## (undated) DEVICE — BLADELESS OBTURATOR: Brand: WECK VISTA

## (undated) DEVICE — SYRINGE MED 30ML STD CLR PLAS LL TIP N CTRL

## (undated) DEVICE — WRAP HIP COMPR

## (undated) DEVICE — BIPOLAR SEALER 23-112-1 AQM 6.0: Brand: AQUAMANTYS™

## (undated) DEVICE — VIOLET BRAIDED (POLYGLACTIN 910), SYNTHETIC ABSORBABLE SUTURE: Brand: COATED VICRYL

## (undated) DEVICE — DRAPE,ROBOTICS,STERILE: Brand: MEDLINE

## (undated) DEVICE — VISUALIZATION SYSTEM: Brand: CLEARIFY

## (undated) DEVICE — SHEET,DRAPE,53X77,STERILE: Brand: MEDLINE

## (undated) DEVICE — BANDAGE,COHESIVE,TAN,4X5YD,LF,STRL: Brand: MEDLINE

## (undated) DEVICE — NEEDLE SPNL 18GA L3.5IN PNK QNCKE STYL DISP

## (undated) DEVICE — Device: Brand: STABLECUT®

## (undated) DEVICE — GLOVE SUR 6.5 SENSICARE PI PIP GRN PWD F

## (undated) DEVICE — MEDI-VAC NON-CONDUCTIVE SUCTION TUBING: Brand: CARDINAL HEALTH

## (undated) DEVICE — SUT MCRYL 4-0 27IN ABSRB UD L24MM PS-1

## (undated) DEVICE — SUTURE VICRYL 0 CT-1

## (undated) DEVICE — SUTURE MONOCRYL 3-0 PS-2

## (undated) DEVICE — NEEDLE SPINAL 18X3-1/2 PINK.

## (undated) DEVICE — SUT PROL 0 30IN CT-2 NABSRB BLU L26MM 1/2 CIR

## (undated) DEVICE — ARM DRAPE

## (undated) DEVICE — ABSORBABLE WOUND CLOSURE DEVICE: Brand: V-LOC 180

## (undated) DEVICE — DRAPE,ABDOMINAL,MAJOR,STERILE: Brand: MEDLINE

## (undated) DEVICE — TRAY SKIN PREP PVP-1

## (undated) DEVICE — EXOFIN TISSUE ADHESIVE 1.0ML

## (undated) DEVICE — 2T11 #2 PDO 36 X 36: Brand: 2T11 #2 PDO 36 X 36

## (undated) DEVICE — LARGE NEEDLE DRIVER: Brand: ENDOWRIST

## (undated) DEVICE — LIGHT HANDLE

## (undated) DEVICE — COVER,LIGHT,CAMERA,HARD,1/PK,STRL: Brand: MEDLINE

## (undated) DEVICE — AIRSEAL TRI-LUMEN LILTERED TUBE SET: Brand: AIRSEAL

## (undated) DEVICE — PAD POS 36IN DISP SURGYPAD

## (undated) DEVICE — CHLORAPREP 26ML APPLICATOR

## (undated) NOTE — LETTER
Patient Name: Heidi Gan  Surgery Date: 2/21/2022    CSN: [id number]  Medical Record: [id number]     Surgeon(s): Shanika Suarez MD    Consent Procedure: RIGHT TOTAL KNEE ARTHROPLASTY    Anesthesia Type: Choice    ===============================================      Patient's surgery was rescheduled to 2/21/22. Previous tests : Urinalysis with culture reflex and MRSA were resulted 12/28/21 and beyond the 30 day guideline. Please let us know if acceptable or need to be repeated. Thank you.

## (undated) NOTE — LETTER
OUTSIDE TESTING RESULT REQUEST     IMPORTANT: FOR YOUR IMMEDIATE ATTENTION  Please FAX all test results listed below to: [phone]     Testing already done on or about: 21    * * * * If testing is NOT complete, arrange with patient A.S.A.P. * * * *      Patient Name: Vikash Curry  Surgery Date: 2022  CSN: [id number]  Medical Record: [id number]   : 1950 - A: 66 y      Sex: female  Surgeon(s):  Ben Long MD  Procedure: RIGHT TOAL KNEE ARTHROPLASTY   Anesthesia Type: Choice     Surgeon: Ben Long MD     The following Testing and Time Line are REQUIRED PER ANESTHESIA     EKG READ AND SIGNED WITHIN   90 days  CBC [with Differential & Platelets] within  90 days  CMP (requires 4 hour fast) within  90 days  PT/INR within  30 days  PTT within  30 days  UA with Reflex to Culture within  30 days  MSSA/MRSA Nasal screening within 30 days      Thank You,   Sent by: Shannon Simmosn  5/13/15

## (undated) NOTE — LETTER
To: Reena Zhu MD Date:  2/18/2022        Patient Name: Tracy Mcneill / Sex: 7/20/1950-A: 66 y  female      CSN: [id number]      Medical Records: [id number]    MSSA/MRSA and UA with reflex Testing    SURGERY DATE: 2/21/2022              PROCEDURE:  RIGHT TOTAL KNEE ARTHROPLASTY, Right    The above patient had a MSSA/MRSA screen and UA with reflex done on 12/28/21, but it is over the 30 day testing  requirement. Is it okay to use this testing for the above scheduled procedure? Yes ______  No, repeat the test _______    _________________________________________              ____________  MD signature       Date    Please return this completed and signed response to [phone]    Thank you.   DIPESH 6/23/16 RUSSELL